# Patient Record
Sex: FEMALE | Race: WHITE
[De-identification: names, ages, dates, MRNs, and addresses within clinical notes are randomized per-mention and may not be internally consistent; named-entity substitution may affect disease eponyms.]

---

## 2020-04-11 ENCOUNTER — HOSPITAL ENCOUNTER (INPATIENT)
Dept: HOSPITAL 84 - D.ER | Age: 65
LOS: 2 days | Discharge: ANOTHER HOSPITAL | DRG: 917 | End: 2020-04-13
Attending: INTERNAL MEDICINE | Admitting: INTERNAL MEDICINE
Payer: COMMERCIAL

## 2020-04-11 VITALS — DIASTOLIC BLOOD PRESSURE: 84 MMHG | SYSTOLIC BLOOD PRESSURE: 144 MMHG

## 2020-04-11 VITALS — SYSTOLIC BLOOD PRESSURE: 118 MMHG | DIASTOLIC BLOOD PRESSURE: 71 MMHG

## 2020-04-11 VITALS — SYSTOLIC BLOOD PRESSURE: 164 MMHG | DIASTOLIC BLOOD PRESSURE: 87 MMHG

## 2020-04-11 VITALS — SYSTOLIC BLOOD PRESSURE: 134 MMHG | DIASTOLIC BLOOD PRESSURE: 81 MMHG

## 2020-04-11 VITALS — SYSTOLIC BLOOD PRESSURE: 167 MMHG | DIASTOLIC BLOOD PRESSURE: 91 MMHG

## 2020-04-11 VITALS — HEIGHT: 62 IN | WEIGHT: 94 LBS | BODY MASS INDEX: 17.3 KG/M2 | BODY MASS INDEX: 17.3 KG/M2

## 2020-04-11 VITALS — DIASTOLIC BLOOD PRESSURE: 98 MMHG | SYSTOLIC BLOOD PRESSURE: 181 MMHG

## 2020-04-11 VITALS — SYSTOLIC BLOOD PRESSURE: 176 MMHG | DIASTOLIC BLOOD PRESSURE: 91 MMHG

## 2020-04-11 VITALS — SYSTOLIC BLOOD PRESSURE: 165 MMHG | DIASTOLIC BLOOD PRESSURE: 99 MMHG

## 2020-04-11 VITALS — SYSTOLIC BLOOD PRESSURE: 140 MMHG | DIASTOLIC BLOOD PRESSURE: 78 MMHG

## 2020-04-11 VITALS — SYSTOLIC BLOOD PRESSURE: 123 MMHG | DIASTOLIC BLOOD PRESSURE: 73 MMHG

## 2020-04-11 VITALS — DIASTOLIC BLOOD PRESSURE: 84 MMHG | SYSTOLIC BLOOD PRESSURE: 155 MMHG

## 2020-04-11 DIAGNOSIS — F17.203: ICD-10-CM

## 2020-04-11 DIAGNOSIS — F33.2: ICD-10-CM

## 2020-04-11 DIAGNOSIS — N17.9: ICD-10-CM

## 2020-04-11 DIAGNOSIS — N39.0: ICD-10-CM

## 2020-04-11 DIAGNOSIS — I25.10: ICD-10-CM

## 2020-04-11 DIAGNOSIS — E11.65: ICD-10-CM

## 2020-04-11 DIAGNOSIS — T43.592A: Primary | ICD-10-CM

## 2020-04-11 DIAGNOSIS — G92: ICD-10-CM

## 2020-04-11 DIAGNOSIS — F41.1: ICD-10-CM

## 2020-04-11 DIAGNOSIS — I10: ICD-10-CM

## 2020-04-11 DIAGNOSIS — E83.42: ICD-10-CM

## 2020-04-11 DIAGNOSIS — F41.8: ICD-10-CM

## 2020-04-11 LAB
ALBUMIN SERPL-MCNC: 2.6 G/DL (ref 3.4–5)
ALP SERPL-CCNC: 116 U/L (ref 30–120)
ALT SERPL-CCNC: 15 U/L (ref 10–68)
AMPHETAMINES UR QL SCN: NEGATIVE QUAL
ANION GAP SERPL CALC-SCNC: 13.4 MMOL/L (ref 8–16)
APAP SERPL-MCNC: 17.2 UG/ML (ref 10–30)
BACTERIA #/AREA URNS HPF: (no result) /HPF
BARBITURATES UR QL SCN: NEGATIVE QUAL
BASOPHILS NFR BLD AUTO: 0.3 % (ref 0–2)
BENZODIAZ UR QL SCN: NEGATIVE QUAL
BILIRUB SERPL-MCNC: 0.19 MG/DL (ref 0.2–1.3)
BILIRUB SERPL-MCNC: NEGATIVE MG/DL
BUN SERPL-MCNC: 12 MG/DL (ref 7–18)
BZE UR QL SCN: NEGATIVE QUAL
CALCIUM SERPL-MCNC: 8.1 MG/DL (ref 8.5–10.1)
CANNABINOIDS UR QL SCN: NEGATIVE QUAL
CHLORIDE SERPL-SCNC: 105 MMOL/L (ref 98–107)
CO2 SERPL-SCNC: 24.6 MMOL/L (ref 21–32)
CREAT SERPL-MCNC: 0.7 MG/DL (ref 0.6–1.3)
EOSINOPHIL NFR BLD: 1.4 % (ref 0–7)
ERYTHROCYTE [DISTWIDTH] IN BLOOD BY AUTOMATED COUNT: 13.7 % (ref 11.5–14.5)
EST. AVERAGE GLUCOSE BLD GHB EST-MCNC: 306 MG/DL (ref 74–154)
ETHANOL SERPL-MCNC: 0 MG/DL (ref 0–10)
GLOBULIN SER-MCNC: 3 G/L
GLUCOSE SERPL-MCNC: 1000 MG/DL
GLUCOSE SERPL-MCNC: 415 MG/DL (ref 74–106)
HCT VFR BLD CALC: 41.8 % (ref 36–48)
HGB BLD-MCNC: 13.8 G/DL (ref 12–16)
IMM GRANULOCYTES NFR BLD: 0.6 % (ref 0–5)
KETONES UR STRIP-MCNC: NEGATIVE MG/DL
LYMPHOCYTES NFR BLD AUTO: 28.6 % (ref 15–50)
MAGNESIUM SERPL-MCNC: 1.5 MG/DL (ref 1.8–2.4)
MCH RBC QN AUTO: 31.1 PG (ref 26–34)
MCHC RBC AUTO-ENTMCNC: 33 G/DL (ref 31–37)
MCV RBC: 94.1 FL (ref 80–100)
MONOCYTES NFR BLD: 6.7 % (ref 2–11)
NEUTROPHILS NFR BLD AUTO: 62.4 % (ref 40–80)
NITRITE UR-MCNC: POSITIVE MG/ML
OPIATES UR QL SCN: NEGATIVE QUAL
OSMOLALITY SERPL CALC.SUM OF ELEC: 295 MOSM/KG (ref 275–300)
PCP UR QL SCN: NEGATIVE QUAL
PH UR STRIP: 5 [PH] (ref 5–6)
PLATELET # BLD: 269 10X3/UL (ref 130–400)
PMV BLD AUTO: 10.3 FL (ref 7.4–10.4)
POTASSIUM SERPL-SCNC: 4 MMOL/L (ref 3.5–5.1)
PROT SERPL-MCNC: 5.6 G/DL (ref 6.4–8.2)
RBC # BLD AUTO: 4.44 10X6/UL (ref 4–5.4)
RBC #/AREA URNS HPF: (no result) /HPF (ref 0–5)
SODIUM SERPL-SCNC: 139 MMOL/L (ref 136–145)
SP GR UR STRIP: 1.01 (ref 1–1.02)
SQUAMOUS #/AREA URNS HPF: (no result) /HPF (ref 0–5)
TSH SERPL-ACNC: 0.06 UIU/ML (ref 0.36–3.74)
UROBILINOGEN UR-MCNC: NORMAL MG/DL
WBC # BLD AUTO: 7.1 10X3/UL (ref 4.8–10.8)

## 2020-04-12 VITALS — SYSTOLIC BLOOD PRESSURE: 141 MMHG | DIASTOLIC BLOOD PRESSURE: 73 MMHG

## 2020-04-12 VITALS — DIASTOLIC BLOOD PRESSURE: 77 MMHG | SYSTOLIC BLOOD PRESSURE: 151 MMHG

## 2020-04-12 VITALS — SYSTOLIC BLOOD PRESSURE: 137 MMHG | DIASTOLIC BLOOD PRESSURE: 71 MMHG

## 2020-04-12 VITALS — DIASTOLIC BLOOD PRESSURE: 93 MMHG | SYSTOLIC BLOOD PRESSURE: 174 MMHG

## 2020-04-12 VITALS — SYSTOLIC BLOOD PRESSURE: 136 MMHG | DIASTOLIC BLOOD PRESSURE: 74 MMHG

## 2020-04-12 VITALS — DIASTOLIC BLOOD PRESSURE: 98 MMHG | SYSTOLIC BLOOD PRESSURE: 151 MMHG

## 2020-04-12 VITALS — DIASTOLIC BLOOD PRESSURE: 76 MMHG | SYSTOLIC BLOOD PRESSURE: 140 MMHG

## 2020-04-12 VITALS — DIASTOLIC BLOOD PRESSURE: 96 MMHG | SYSTOLIC BLOOD PRESSURE: 167 MMHG

## 2020-04-12 VITALS — SYSTOLIC BLOOD PRESSURE: 140 MMHG | DIASTOLIC BLOOD PRESSURE: 76 MMHG

## 2020-04-12 VITALS — SYSTOLIC BLOOD PRESSURE: 148 MMHG | DIASTOLIC BLOOD PRESSURE: 75 MMHG

## 2020-04-12 VITALS — DIASTOLIC BLOOD PRESSURE: 92 MMHG | SYSTOLIC BLOOD PRESSURE: 162 MMHG

## 2020-04-12 VITALS — SYSTOLIC BLOOD PRESSURE: 172 MMHG | DIASTOLIC BLOOD PRESSURE: 84 MMHG

## 2020-04-12 VITALS — DIASTOLIC BLOOD PRESSURE: 69 MMHG | SYSTOLIC BLOOD PRESSURE: 131 MMHG

## 2020-04-12 VITALS — DIASTOLIC BLOOD PRESSURE: 81 MMHG | SYSTOLIC BLOOD PRESSURE: 153 MMHG

## 2020-04-12 VITALS — SYSTOLIC BLOOD PRESSURE: 176 MMHG | DIASTOLIC BLOOD PRESSURE: 95 MMHG

## 2020-04-12 VITALS — DIASTOLIC BLOOD PRESSURE: 71 MMHG | SYSTOLIC BLOOD PRESSURE: 139 MMHG

## 2020-04-12 VITALS — SYSTOLIC BLOOD PRESSURE: 142 MMHG | DIASTOLIC BLOOD PRESSURE: 69 MMHG

## 2020-04-12 VITALS — SYSTOLIC BLOOD PRESSURE: 170 MMHG | DIASTOLIC BLOOD PRESSURE: 90 MMHG

## 2020-04-12 VITALS — DIASTOLIC BLOOD PRESSURE: 78 MMHG | SYSTOLIC BLOOD PRESSURE: 146 MMHG

## 2020-04-12 LAB
ANION GAP SERPL CALC-SCNC: 11.7 MMOL/L (ref 8–16)
BASOPHILS NFR BLD AUTO: 0.4 % (ref 0–2)
BUN SERPL-MCNC: 6 MG/DL (ref 7–18)
CALCIUM SERPL-MCNC: 8.4 MG/DL (ref 8.5–10.1)
CHLORIDE SERPL-SCNC: 108 MMOL/L (ref 98–107)
CO2 SERPL-SCNC: 25.4 MMOL/L (ref 21–32)
CREAT SERPL-MCNC: 0.6 MG/DL (ref 0.6–1.3)
EOSINOPHIL NFR BLD: 1.9 % (ref 0–7)
ERYTHROCYTE [DISTWIDTH] IN BLOOD BY AUTOMATED COUNT: 13.9 % (ref 11.5–14.5)
GLUCOSE SERPL-MCNC: 257 MG/DL (ref 74–106)
HCT VFR BLD CALC: 39.1 % (ref 36–48)
HGB BLD-MCNC: 12.7 G/DL (ref 12–16)
IMM GRANULOCYTES NFR BLD: 0.1 % (ref 0–5)
LYMPHOCYTES NFR BLD AUTO: 29.4 % (ref 15–50)
MCH RBC QN AUTO: 30.5 PG (ref 26–34)
MCHC RBC AUTO-ENTMCNC: 32.5 G/DL (ref 31–37)
MCV RBC: 93.8 FL (ref 80–100)
MONOCYTES NFR BLD: 7 % (ref 2–11)
NEUTROPHILS NFR BLD AUTO: 61.2 % (ref 40–80)
OSMOLALITY SERPL CALC.SUM OF ELEC: 287 MOSM/KG (ref 275–300)
PLATELET # BLD: 261 10X3/UL (ref 130–400)
PMV BLD AUTO: 9.4 FL (ref 7.4–10.4)
POTASSIUM SERPL-SCNC: 4.1 MMOL/L (ref 3.5–5.1)
RBC # BLD AUTO: 4.17 10X6/UL (ref 4–5.4)
SODIUM SERPL-SCNC: 141 MMOL/L (ref 136–145)
WBC # BLD AUTO: 7.9 10X3/UL (ref 4.8–10.8)

## 2020-04-13 ENCOUNTER — HOSPITAL ENCOUNTER (INPATIENT)
Dept: HOSPITAL 84 - D.PSYCH | Age: 65
LOS: 8 days | Discharge: HOME | DRG: 885 | End: 2020-04-21
Attending: PSYCHIATRY & NEUROLOGY | Admitting: PSYCHIATRY & NEUROLOGY
Payer: COMMERCIAL

## 2020-04-13 VITALS — SYSTOLIC BLOOD PRESSURE: 128 MMHG | DIASTOLIC BLOOD PRESSURE: 67 MMHG

## 2020-04-13 VITALS
BODY MASS INDEX: 17.57 KG/M2 | WEIGHT: 95.45 LBS | WEIGHT: 95.45 LBS | BODY MASS INDEX: 17.57 KG/M2 | HEIGHT: 62 IN | BODY MASS INDEX: 17.57 KG/M2 | HEIGHT: 62 IN

## 2020-04-13 VITALS — DIASTOLIC BLOOD PRESSURE: 67 MMHG | SYSTOLIC BLOOD PRESSURE: 128 MMHG

## 2020-04-13 VITALS — DIASTOLIC BLOOD PRESSURE: 73 MMHG | SYSTOLIC BLOOD PRESSURE: 109 MMHG

## 2020-04-13 VITALS — DIASTOLIC BLOOD PRESSURE: 67 MMHG | SYSTOLIC BLOOD PRESSURE: 127 MMHG

## 2020-04-13 VITALS — SYSTOLIC BLOOD PRESSURE: 167 MMHG | DIASTOLIC BLOOD PRESSURE: 68 MMHG

## 2020-04-13 VITALS — DIASTOLIC BLOOD PRESSURE: 88 MMHG | SYSTOLIC BLOOD PRESSURE: 129 MMHG

## 2020-04-13 VITALS — SYSTOLIC BLOOD PRESSURE: 160 MMHG | DIASTOLIC BLOOD PRESSURE: 74 MMHG

## 2020-04-13 VITALS — DIASTOLIC BLOOD PRESSURE: 89 MMHG | SYSTOLIC BLOOD PRESSURE: 122 MMHG

## 2020-04-13 VITALS — SYSTOLIC BLOOD PRESSURE: 131 MMHG | DIASTOLIC BLOOD PRESSURE: 92 MMHG

## 2020-04-13 VITALS — DIASTOLIC BLOOD PRESSURE: 101 MMHG | SYSTOLIC BLOOD PRESSURE: 140 MMHG

## 2020-04-13 VITALS — SYSTOLIC BLOOD PRESSURE: 141 MMHG | DIASTOLIC BLOOD PRESSURE: 91 MMHG

## 2020-04-13 VITALS — DIASTOLIC BLOOD PRESSURE: 78 MMHG | SYSTOLIC BLOOD PRESSURE: 163 MMHG

## 2020-04-13 VITALS — DIASTOLIC BLOOD PRESSURE: 77 MMHG | SYSTOLIC BLOOD PRESSURE: 176 MMHG

## 2020-04-13 DIAGNOSIS — E11.65: ICD-10-CM

## 2020-04-13 DIAGNOSIS — F33.2: Primary | ICD-10-CM

## 2020-04-13 DIAGNOSIS — T43.8X2D: ICD-10-CM

## 2020-04-13 DIAGNOSIS — N39.0: ICD-10-CM

## 2020-04-13 DIAGNOSIS — Z89.512: ICD-10-CM

## 2020-04-13 DIAGNOSIS — I10: ICD-10-CM

## 2020-04-13 DIAGNOSIS — I25.118: ICD-10-CM

## 2020-04-13 DIAGNOSIS — B96.20: ICD-10-CM

## 2020-04-13 LAB
ANION GAP SERPL CALC-SCNC: 12.7 MMOL/L (ref 8–16)
BASOPHILS NFR BLD AUTO: 0.4 % (ref 0–2)
BUN SERPL-MCNC: 11 MG/DL (ref 7–18)
CALCIUM SERPL-MCNC: 8.1 MG/DL (ref 8.5–10.1)
CHLORIDE SERPL-SCNC: 111 MMOL/L (ref 98–107)
CO2 SERPL-SCNC: 22.3 MMOL/L (ref 21–32)
CREAT SERPL-MCNC: 0.7 MG/DL (ref 0.6–1.3)
EOSINOPHIL NFR BLD: 1.2 % (ref 0–7)
ERYTHROCYTE [DISTWIDTH] IN BLOOD BY AUTOMATED COUNT: 14.1 % (ref 11.5–14.5)
GLUCOSE SERPL-MCNC: 177 MG/DL (ref 74–106)
HCT VFR BLD CALC: 36.8 % (ref 36–48)
HGB BLD-MCNC: 11.6 G/DL (ref 12–16)
IMM GRANULOCYTES NFR BLD: 0.4 % (ref 0–5)
LYMPHOCYTES NFR BLD AUTO: 38 % (ref 15–50)
MCH RBC QN AUTO: 30.1 PG (ref 26–34)
MCHC RBC AUTO-ENTMCNC: 31.5 G/DL (ref 31–37)
MCV RBC: 95.6 FL (ref 80–100)
MONOCYTES NFR BLD: 6.6 % (ref 2–11)
NEUTROPHILS NFR BLD AUTO: 53.4 % (ref 40–80)
OSMOLALITY SERPL CALC.SUM OF ELEC: 285 MOSM/KG (ref 275–300)
PLATELET # BLD: 256 10X3/UL (ref 130–400)
PMV BLD AUTO: 9.8 FL (ref 7.4–10.4)
POTASSIUM SERPL-SCNC: 4 MMOL/L (ref 3.5–5.1)
RBC # BLD AUTO: 3.85 10X6/UL (ref 4–5.4)
SODIUM SERPL-SCNC: 142 MMOL/L (ref 136–145)
WBC # BLD AUTO: 8.1 10X3/UL (ref 4.8–10.8)

## 2020-04-13 NOTE — NUR
NEW ADMIT TO DOCTOR PITTS ON SENIOR CARE FROM [facility] FOR ALTERED MENTAL
STATUS. PATIENT OVERDOSED ON SEROQUEL AND WAS INTUBATED IN ICU. AFTER
TRANSFERRING TO [facility], PATIENT WAS AGGRESSIVE WITH STAFF. PATIENT
TRANSFERRED TO SENIOR CARE VIA WHEELCHAIR. UPON ARRIVAL TO [facility],
PATIENT WAS RUDE AND ARGUMENTATIVE WITH STAFF. PATIENT DENIES SI. CONSENTS
TO TREAT RECEIVED FROM PATIENT. PATIENT IS A DNR. CODE WORD GIVEN TO SP0USE.
PATIENT ORIENTED TO UNIT, ROOM, AND CALL BELL SYSTEM.

## 2020-04-13 NOTE — [facility]ARE
CASE MANAGEMENT DISCHARGE SUMMARY
 
 
PATIENT: ALEXEY BAGLEY                        UNIT: [id number]
ACCOUNT#: [id number]                       ADM DATE: 20
AGE: 64     : 53  SEX: F            ROOM/BED: D.2208    
AUTHOR: JASMYNE EPPERSON                             PHYSICIAN:                               
 
REFERRING PHYSICIAN: MARCIA GARDNER MD               
DATE OF SERVICE: 20
Discharge Plan
 
 
Patient Name: ALEXEY BAGLEY
[facility]
Encounter #: [id number]
Medical Record #: [id number]
: 1955
Planned Disposition: Psych facility
Anticipated Discharge Date: 
 
Discharge Date: 2020
Expected LOS: 
Initial Reviewer: [id number]
Initial Review Date: 2020
Generated: 20   6:58 pm 
Comments
 
DCP- Discharge Planning
 
Updated by [id number]: Abigail Venegas on 20   4:45 pm CT
LATE ENTRY:  
RECEIVED A CALL FROM LILIAN ([phone]) AT THE  
OFFICE STATING THAT HER FAMILY HAS FILED FOR AN INVOLUNTARY COMMITMENT AND 
SHE NEEDED DOCUMENTATION, I ASKED PATIENT IF I COULD FAX THE DOCUMENTATION 
THAT THEY REQUESTED AND SHE DID NOT GIVE PERMISSION UNTIL SHE SPOKE WITH THE 
MANAGER OF SENIOR CARE. KADE WITH SENIOR CARE CAME AND SPOKE WITH PATIENT AND 
SHE WAS AGREEABLE TO GO, BUT WHEN DISCHARGE WAS GIVEN TO GO DOWN THERE THE 
PATIENT WAS NOT AGREEABLE. DR ISLAS PUT A HOLD ON HER AND THE PATIENT WAS 
ADMITTED TO SENIOR CARE.
  
 
 
 
 
 
 
 
Patient Name: ALEXEY BAGLEY
 
Encounter #: [id number]
Page [id number]
 
 
 
 
 
Electronically Signed by JASMYNE EPPERSON on 20 at 1759
 
 
 
 
 
 
**All edits/amendments must be made on the electronic document**
 
DICTATION DATE: 20     : SENDY  20     
RPT#: [id number]                                DC DATE:20
                                               STATUS: DIS IN  
[facility]
[address] [place] [address]
***END OF REPORT***

## 2020-04-13 NOTE — NUR
RECEIVED IN DINING AREA. MOVING ABOUT IN WHEELCHAIR. COOPERATIVE WITH CARE AND
ASSESSMENT AT THIS TIME. NO SIGNS OF AGGRESSION. REDIRECT AND REORIENT AS
NEEDED. ENCOURAGE TO EXPRESS NEEDS. SITTING IN WHEELCHAIR AT THIS TIME.
CONTINUE PLAN OF CARE.

## 2020-04-14 VITALS — SYSTOLIC BLOOD PRESSURE: 133 MMHG | DIASTOLIC BLOOD PRESSURE: 75 MMHG

## 2020-04-14 VITALS — SYSTOLIC BLOOD PRESSURE: 100 MMHG | DIASTOLIC BLOOD PRESSURE: 55 MMHG

## 2020-04-14 LAB
ALBUMIN SERPL-MCNC: 2.6 G/DL (ref 3.4–5)
ALP SERPL-CCNC: 110 U/L (ref 30–120)
ALT SERPL-CCNC: 15 U/L (ref 10–68)
ANION GAP SERPL CALC-SCNC: 15.3 MMOL/L (ref 8–16)
BASOPHILS NFR BLD AUTO: 0.2 % (ref 0–2)
BILIRUB SERPL-MCNC: 0.16 MG/DL (ref 0.2–1.3)
BUN SERPL-MCNC: 6 MG/DL (ref 7–18)
CALCIUM SERPL-MCNC: 8.4 MG/DL (ref 8.5–10.1)
CHLORIDE SERPL-SCNC: 107 MMOL/L (ref 98–107)
CHOLEST/HDLC SERPL: 4.2 RATIO (ref 2.3–4.1)
CO2 SERPL-SCNC: 21 MMOL/L (ref 21–32)
CREAT SERPL-MCNC: 0.7 MG/DL (ref 0.6–1.3)
EOSINOPHIL NFR BLD: 1.2 % (ref 0–7)
ERYTHROCYTE [DISTWIDTH] IN BLOOD BY AUTOMATED COUNT: 14 % (ref 11.5–14.5)
EST. AVERAGE GLUCOSE BLD GHB EST-MCNC: 306 MG/DL (ref 74–154)
GLOBULIN SER-MCNC: 2.9 G/L
GLUCOSE SERPL-MCNC: 129 MG/DL (ref 74–106)
HCT VFR BLD CALC: 36.3 % (ref 36–48)
HDLC SERPL-MCNC: 39 MG/DL (ref 32–96)
HGB BLD-MCNC: 11.6 G/DL (ref 12–16)
IMM GRANULOCYTES NFR BLD: 0.4 % (ref 0–5)
LDL-HDL RATIO: 2.2 RATIO (ref 1.5–3.5)
LDLC SERPL-MCNC: 86 MG/DL (ref 0–100)
LYMPHOCYTES NFR BLD AUTO: 35.2 % (ref 15–50)
MCH RBC QN AUTO: 30.4 PG (ref 26–34)
MCHC RBC AUTO-ENTMCNC: 32 G/DL (ref 31–37)
MCV RBC: 95 FL (ref 80–100)
MONOCYTES NFR BLD: 7.9 % (ref 2–11)
NEUTROPHILS NFR BLD AUTO: 55.1 % (ref 40–80)
OSMOLALITY SERPL CALC.SUM OF ELEC: 277 MOSM/KG (ref 275–300)
PLATELET # BLD: 251 10X3/UL (ref 130–400)
PMV BLD AUTO: 9.9 FL (ref 7.4–10.4)
POTASSIUM SERPL-SCNC: 4.3 MMOL/L (ref 3.5–5.1)
PROT SERPL-MCNC: 5.5 G/DL (ref 6.4–8.2)
RBC # BLD AUTO: 3.82 10X6/UL (ref 4–5.4)
SODIUM SERPL-SCNC: 139 MMOL/L (ref 136–145)
TRIGL SERPL-MCNC: 193 MG/DL (ref 30–200)
TSH SERPL-ACNC: 0.34 UIU/ML (ref 0.36–3.74)
WBC # BLD AUTO: 9.4 10X3/UL (ref 4.8–10.8)

## 2020-04-14 NOTE — NUR
RECEIVED IN DAYROOM. SITTING IN A WHEELCHAIR WITH PEERS AT HER SIDE. CALM AND
COOPERATIVE WITH CARE AND ASSESSMENT. NO DEMANDING BEHAVIOR AT THIS TIME.
REDIRECT AND REORIENT AS NEEDED. SITTING CALMLY AT THIS TIME.
CONTINUE PLAN OF CARE.

## 2020-04-14 NOTE — M[facility]ARE
CASE MANAGEMENT DISCHARGE SUMMARY
 
 
PATIENT: ALEXEY BAGLEY                        UNIT: [id number]
ACCOUNT#: [id number]                       ADM DATE: 20
AGE: 64     : 53  SEX: F            ROOM/BED: D.2208    
AUTHOR: JASMYNE EPPERSON                             PHYSICIAN:                               
 
REFERRING PHYSICIAN: MARCIA GARDNER MD               
DATE OF SERVICE: 20
Discharge Plan
 
 
Patient Name: ALEXEY BAGLEY
[facility]
Encounter #: [id number]
Medical Record #: [id number]
: 1955
Planned Disposition: Psych facility
Anticipated Discharge Date: 
 
Discharge Date: 2020
Expected LOS: 
Initial Reviewer: [id number]
Initial Review Date: 2020
Generated: 20  10:00 am 
Comments
 
DCP- Discharge Planning
 
Updated by [id number]: Abigail Venegas on 20   4:45 pm CT
LATE ENTRY:  
RECEIVED A CALL FROM LILIAN ([phone]) AT THE  
OFFICE STATING THAT HER FAMILY HAS FILED FOR AN INVOLUNTARY COMMITMENT AND 
SHE NEEDED DOCUMENTATION, I ASKED PATIENT IF I COULD FAX THE DOCUMENTATION 
THAT THEY REQUESTED AND SHE DID NOT GIVE PERMISSION UNTIL SHE SPOKE WITH THE 
MANAGER OF SENIOR CARE. KADE WITH SENIOR CARE CAME AND SPOKE WITH PATIENT AND 
SHE WAS AGREEABLE TO GO, BUT WHEN DISCHARGE WAS GIVEN TO GO DOWN THERE THE 
PATIENT WAS NOT AGREEABLE. DR ISLAS PUT A HOLD ON HER AND THE PATIENT WAS 
ADMITTED TO SENIOR CARE.
  
 
 
 
 
 
 
 
 
Last DP export: 20   4:59 p
 
Patient Name: ALEXEY BAGLEY
Encounter #: [id number]
Page [id number]
 
 
 
 
 
Electronically Signed by JASMYNE EPPERSON on 20 at 0900
 
 
 
 
 
 
**All edits/amendments must be made on the electronic document**
 
DICTATION DATE: 20 09     : SENDY  20 09     
RPT#: [id number]                                DC DATE:20
                                               STATUS: DIS IN  
[facility]
 [place] [place] [address]
***END OF REPORT***

## 2020-04-14 NOTE — PSY
PATIENT NAME:ALEXEY BAGLEY                                  MEDICAL RECORD: [id number]
: 53                                              LOCATION:[facility]
ADMISSION DATE: 20    ACCOUNT: [id number]
                                                           
PSYCHIATRIC EVALUATION
 
 
DATE OF EVALUATION: 20
 
 
IDENTIFYING DATA:  The patient is 64 years old and she is admitted to the
hospital on a voluntary basis.
 
CHIEF COMPLAINT:  Depression.
 
HISTORY OF PRESENT ILLNESS:  The patient apparently took Seroquel and presented
herself to the hospital.  She says that she is doing this to try and help
herself sleep, but she made suicidal statements in the Emergency Room, which she
later backed away from.  She now says she is not suicidal.  She also says she is
not depressed, but when asked about individual depressive symptoms, she clearly
has numerous symptoms.  She denies psychotic symptoms as well as thoughts of
harming others.
 
PAST MEDICAL HISTORY:  Significant for hypertension, coronary artery disease and
diabetes.  The patient fell a few days ago on her left hip, which has been
x-rayed and has no evidence of a fracture.
 
PAST PSYCHIATRIC HISTORY:  Denied by the patient, but on closer evaluation, it
is clear that she had significant problems on an outpatient basis.  She says she
has never attempted to harm herself.
 
FAMILY HISTORY:  Significant for diabetes and heart disease.
 
ALLERGIES:  CODEINE.
 
CURRENT MEDICATIONS:  Include Levaquin, hydrocodone, Tylenol, Klonopin,
Protonix, insulin, Seroquel, Ativan.
 
SOCIAL HISTORY:  The patient is  and does have adult children.  She
denies a history of drug or alcohol abuse.  She denies legal entanglements.
 
MENTAL STATUS EXAMINATION:  The patient is awake, alert and oriented to person,
place and situation.  She is mildly mistaken about the date.  Her mood is angry.
 Her affect is constricted.  Thought processes are circumstantial.  Memory,
concentration, and abstraction abilities are moderately impaired and she denies
any intent to harm herself or others as well as active psychotic symptoms.
 
ASSESSMENT:
AXIS I:
1.  Major depression, severe, recurrent without psychotic features.
2.  Probable polysubstance abuse.
AXIS II:  Cluster B personality traits.
AXIS III:  Hypertension, diabetes, history of myocardial infarction, history of
stroke, left below-knee amputation, antipsychotic overdose, intentional.
AXIS IV:  Moderate stressors.
AXIS V:  Global assessment of functioning is 40.
 
PLAN:  At this time, the patient is admitted to the hospital secondary to a
 
significant overdose of an antipsychotic medication that was made for the
purposes of trying to harm herself.  The amount of medicine she took is not
consistent with trying to get a good night's sleep and she told others that she
was wanting to kill herself, which she now is backing away from.  At this point,
I am going to treat her with antidepressant medications and we will observe her
mood and behaviors.
 
TRANSINT:[id number] Voice Confirmation ID: [id number] DOCUMENT ID: [id number]
                                           
                                           SANDEEP PITTS MD             
 
 
 
Electronically Signed by SANDEEP PITTS on 20 at 1726
 
 
 
 
 
 
 
 
 
 
 
 
 
 
 
 
 
 
 
 
 
 
 
 
 
 
 
 
 
 
 
 
 
 
 
 
 
 
CC:                                                             [id number]
DICTATION DATE: 20 1628     :     20 1651      ADM IN  
                                                                              
[address] [place] [address]

## 2020-04-15 VITALS — DIASTOLIC BLOOD PRESSURE: 74 MMHG | SYSTOLIC BLOOD PRESSURE: 139 MMHG

## 2020-04-15 VITALS — SYSTOLIC BLOOD PRESSURE: 139 MMHG | DIASTOLIC BLOOD PRESSURE: 74 MMHG

## 2020-04-15 VITALS — DIASTOLIC BLOOD PRESSURE: 84 MMHG | SYSTOLIC BLOOD PRESSURE: 136 MMHG

## 2020-04-15 NOTE — NUR
B.) PT IS ALERT AND ORIENTED TO SELF AND SITUATION. SHE IS ABLE MAKE HER NEEDS
KNOWN. SHE IS RECEIVED IN THE DAYROOM ON THE COUCH. SHE IS ABLE TO TRANSFER
WITH ASSIST. SHE IS SOCIALIZING WITH PEERS. SHE IS WEARING HER ISOLATION GOWN
AND GLOVES.
I.) PROVIDED PM MEDICATIONS AS PRESCRIBED. REDIRECT AS NEEDED.
R.) COMPLIANT WITH ALL MEDICATIONS. EASY TO REDIRECT.
P.) WILL CONTINUE TO MONITOR.

## 2020-04-15 NOTE — NUR
RECEIVED IN HALLWAY OUTSIDE OF NURSES STATION. CALM AND COOPERATIVE WITH CARE
AND ASSESSMENT. DENIES SUICIDAL IDEATION. REDIRECT AND REORIENT AS NEEDED.
EATING LUNCH AT THIS TIME. CONTINUE PLAN OF CARE.

## 2020-04-16 VITALS — DIASTOLIC BLOOD PRESSURE: 73 MMHG | SYSTOLIC BLOOD PRESSURE: 120 MMHG

## 2020-04-16 NOTE — PN
PATIENT:ALEXEY BAGLEY                              MEDICAL RECORD: [id number]
                                                         LOCATION:[facility]
                                                         ADMISSION DATE: 04/13/20
 
PROGRESS NOTE
 
 
DATE OF SERVICE:  04/15/2020
 
SUBJECTIVE:  The patient's case was discussed with staff.  She has no new
complaint.
 
OBJECTIVE:  The patient is in good behavioral control.  She has an improvement
in her mood.  She is much less irritable.  She denies that she would seek to
harm herself.  She does endorse numerous depressive symptoms.
 
ASSESSMENT:  Major depression.
 
PLAN:  Current medicines have been reviewed.  I am going to increase the dose of
her antidepressant medication.  I am going to start her on a scheduled dose of
Ultram and we will discontinue her p.r.n. hydrocodone.  She also is requesting
Flexeril for the musculoskeletal discomfort that she is having.
 
TRANSINT:[id number] Voice Confirmation ID: [id number] DOCUMENT ID: [id number]
 
 
 
 
                                           
                                           SANDEEP PITTS MD             
 
 
 
Electronically Signed by SANDEEP PITTS on 04/16/20 at 1537
 
 
 
 
 
 
 
 
 
 
 
 
 
 
 
 
 
CC:                                                             [id number]
DICTATION DATE: 04/15/20 1301     :     04/15/20 1308      ADM IN  
                                                                              
[address] [place] [address]

## 2020-04-16 NOTE — NUR
PT SITTING IN CHAIR WITH LEG PROPPED UP AT THIS TIME. PT IS ALERT AND ORIENTED
AT TIMES. REDIRECT AND REORIENT AS NEEDED. PT DENIES ANY PAIN. PT CONTS ON
CONTACT ISOLATION AND CONTS ON GENTAMICIN 60 MG IM PT HAS HAD 2 DOSES AT THIS
TIME. PT HAS NO S/SX. PT HAS HAD NO BEHAVIORS NOTED AT THIS TIME. PT IS
COMPLIANT WITH STAFF WITH ASSESSMENT AND MEDS. CHAIR ALARM IN PLACE AND
ACTIVE. WILL CONT PLAN OF CARE.

## 2020-04-16 NOTE — NUR
Nutrition Follow-up:
Eating well.
Diet: Diabetic
PO intake: 98% avg x last 6 meals
Wt: 98# (4/14); 98.8# (4/13)
No BMs recorded
Labs noted: Glu 133
Meds noted: vitamin D, Lantus, Humalog
-Encourage PO intake and honor food preferences within diet restrictions.
-Offer Glucerna with meals.
-Monitor wt.
-RD following.

## 2020-04-17 VITALS — DIASTOLIC BLOOD PRESSURE: 79 MMHG | SYSTOLIC BLOOD PRESSURE: 133 MMHG

## 2020-04-17 VITALS — SYSTOLIC BLOOD PRESSURE: 106 MMHG | DIASTOLIC BLOOD PRESSURE: 56 MMHG

## 2020-04-17 NOTE — NUR
PATIENT IS QUIET, DROWSY, ROOM WAS SEARCHED FOR CONTRABAND, NONE FOUND,
PATIENT SAYS THAT SHE IS TIRED AND THAT IS WHY SHE SLEEPS SO MUCH. COMPLIANT
WITH MEDS, CAN DO ADL'S INDEPENDENTLY. WILL FOLLOW POC

## 2020-04-17 NOTE — NUR
The patient is awake and pleasant, she is oriented to self and she has poor
insight into her situation. She is in a w/c and can self propel. She is on
isolation and she is compliant with gloves and a gown. She is confused.
Provide prescribed meds. The patient is compliant with meds and she is
compliant with care. Continue POC.

## 2020-04-17 NOTE — NUR
FSBS 35, PROVIDED ORANGE JUICE WITH 2 PACKETS OF SUGAR, ONE TUB OF PEANUT
BUTTER AND A VANILLA PUDDING, CALLED STEFANY SIDDIQI TO LET HER KNOW.

## 2020-04-17 NOTE — NUR
B)RECEIVED PT SITTING IN CHAIR IN THE DAYROOM WITH HEAD DOWN AND EYES CLOSED.
AROUSED TO VERBA  STIMULI. CONFUSED AND DISORIENTED. NO INSIGHT RELATING THE
REASON FOR HOSPITALIZATION IS "I KEPT FALLING ASLEEP AT THE WHEEL." PATIENT IS
ON CONTACT ISOLATION FOR ESBL IN URINE. WEARING YELLOW ISOLATION GOWN.
I)ADMINISTER MEDS AND MONITOR COMPLIANCE. REORIENT AS NEEDED. R)MED COMPLIANT.
POOR REORIENTATION DUE TO TO IMPAIRED ABILITY TO RETAIN INFORMATION.
P)CONTINUE POC AND PROVIDE SAFE ENVIRONMENT.

## 2020-04-17 NOTE — PN
PATIENT:ALEXEY BAGLEY                              MEDICAL RECORD: [id number]
                                                         LOCATION:[facility]
                                                         ADMISSION DATE: 04/13/20
 
PROGRESS NOTE
 
 
DATE OF SERVICE:  04/16/2020
 
SUBJECTIVE:  The patient's case was discussed with staff.  She has no new
complaint.
 
OBJECTIVE:  The patient denies that she would seek to harm herself or others. 
She is generally tolerating her medications well.
 
ASSESSMENT:  Major depression.
 
PLAN:  The patient's noontime dose of Seroquel is going to be discontinued.  She
will be monitored for clinical changes associated with its use.
 
TRANSINT:[id number] Voice Confirmation ID: [id number] DOCUMENT ID: [id number]
 
 
 
 
                                           
                                           SANDEEP PITTS MD             
 
 
 
Electronically Signed by SANDEEP PITTS on 04/17/20 at 1548
 
 
 
 
 
 
 
 
 
 
 
 
 
 
 
 
 
 
 
 
CC:                                                             [id number]
DICTATION DATE: 04/16/20 1701     :     04/16/20 1714      ADM IN  
                                                                              
[address] [place] [address]

## 2020-04-17 NOTE — NUR
FSBS 425, ORDERED a stat glucose and called Frieda SIDDIQI, she ordered a
one time dose of 20 units Humalog.

## 2020-04-18 VITALS — SYSTOLIC BLOOD PRESSURE: 113 MMHG | DIASTOLIC BLOOD PRESSURE: 67 MMHG

## 2020-04-18 VITALS — SYSTOLIC BLOOD PRESSURE: 127 MMHG | DIASTOLIC BLOOD PRESSURE: 74 MMHG

## 2020-04-18 NOTE — NUR
PATIENT IS CONFUSED, QUIET, ALWAYS DROWSY, STAYS TO HERSELF, POOR EYE CONTACT.
COMPLIANT WITH MEDS, NO ADVERSE REACTION NOTED. WILL FOLLOW POC

## 2020-04-18 NOTE — PN
PATIENT:ALEXEY BAGLEY                              MEDICAL RECORD: [id number]
                                                         [facility]
                                                         ADMISSION DATE: 04/13/20
 
PROGRESS NOTE
 
 
DATE OF SERVICE:  04/17/2020
 
SUBJECTIVE:  The patient's case was discussed with staff.  She has no new
complaint.
 
OBJECTIVE:  The patient is in good behavioral control, but not having any
thoughts of harming herself or others.  She is tolerating her medicines well.
 
ASSESSMENT:  Major depression.
 
PLAN:  Current medications will be maintained.  Long-term prognosis is guarded.
 
TRANSINT:[id number] Voice Confirmation ID: [id number] DOCUMENT ID: [id number]
 
 
 
 
                                           
                                           SANDEEP PITTS MD             
 
 
 
Electronically Signed by SANDEEP PITTS on 04/18/20 at 0841
 
 
 
 
 
 
 
 
 
 
 
 
 
 
 
 
 
 
 
 
 
CC:                                                             [id number]
DICTATION DATE: 04/17/20 1827     :     04/17/20 2039      ADM IN  
                                                                              
[address] [place] [address]

## 2020-04-18 NOTE — NUR
The patient is very drowsy, she is awake enough to eat, but she is sleeping
most of the time. She is able to self propel in a w/c and she can transfer by
herself. She has not shown any aggression this am. Provide prescribed meds.
The patient is compliant with meds. She is cooperative with care. Continue
POC.

## 2020-04-19 VITALS — DIASTOLIC BLOOD PRESSURE: 64 MMHG | SYSTOLIC BLOOD PRESSURE: 104 MMHG

## 2020-04-19 VITALS — DIASTOLIC BLOOD PRESSURE: 81 MMHG | SYSTOLIC BLOOD PRESSURE: 140 MMHG

## 2020-04-19 NOTE — NUR
RECEIVED IN HALLWAY OUTSIDE OF NURSES STATION. CALM AND COOPERATIVE WITH CARE
AND ASSESSMENT. DENIES SUICIDAL IDEATION. NO BEHAVIORS. REDIRECT AND REORIENT
AS NEEDED. EATING LUNCH AT THIS TIME. CONTINUE PLAN OF CARE.

## 2020-04-19 NOTE — NUR
RECEIVED IN DINING ROOM AREA. SITTING IN A WHEELCHAIR WITH EYES CLOSED. CALM
AND COOPERATIVE WITH CARE AND ASSESSMENT. NO SIGNS OF AGGRESSION. REDIRECT AND
REORIENT AS NEEDED. CONTINUES TO SIT QUIETLY IN DAYROOM. CONTINUE PLAN OF
CARE.

## 2020-04-19 NOTE — PN
PATIENT:ALEXEY BAGLEY                              MEDICAL RECORD: [id number]
                                                         LOCATION:[facility]
                                                         ADMISSION DATE: 04/13/20
 
PROGRESS NOTE
 
 
DATE OF SERVICE:  04/18/2020
 
SUBJECTIVE:  The patient's case was discussed with staff.  She has no new
complaint.
 
OBJECTIVE:  The patient is in good behavioral control with limited insight about
her condition.  She tolerates her medications well.
 
ASSESSMENT:  Major depression.
 
PLAN:  The patient will be treated with current medications.  I am, however,
going to taper the dose of the Ultram slightly.  She has no thoughts of harming
herself at this time.
 
TRANSINT:[id number] Voice Confirmation ID: [id number] DOCUMENT ID: [id number]
 
 
 
 
                                           
                                           SANDEEP PITTS MD             
 
 
 
Electronically Signed by SANDEEP PITTS on 04/19/20 at 1342
 
 
 
 
 
 
 
 
 
 
 
 
 
 
 
 
 
 
 
CC:                                                             [id number]
DICTATION DATE: 04/18/20 0856     :     04/18/20 0927      ADM IN  
                                                                              
[id number] [place] [id number]

## 2020-04-20 VITALS — DIASTOLIC BLOOD PRESSURE: 60 MMHG | SYSTOLIC BLOOD PRESSURE: 110 MMHG

## 2020-04-20 VITALS — SYSTOLIC BLOOD PRESSURE: 121 MMHG | DIASTOLIC BLOOD PRESSURE: 58 MMHG

## 2020-04-20 NOTE — NUR
B.) PT IS ALERT AND ORIENTED TO SELF, TIME, AND PLACE. SHE IS HAS POOR INSIGHT
INTO HER SITUATION AT TIMES. SHE USES A WHEELCHAIR TO ASSIT WITH AMBULATION.
SHE IS CALM AND COOPERATIVE WITH STAFF AND IS OBSERVED SOCIALIZING WITH PEERS.
I.) PROVIDED PM MEDICATIONS AS PRESCRIBED. REDIRECT AS NEEDED.
R.) COMPLIANT WITH ALL MEDICATIONS. EASY TO REDIRECT.
P.) WILL CONTINUE TO MONITOR.

## 2020-04-21 VITALS — DIASTOLIC BLOOD PRESSURE: 63 MMHG | SYSTOLIC BLOOD PRESSURE: 131 MMHG

## 2020-04-21 NOTE — PN
PATIENT:ALEXEY BAGLEY                              MEDICAL RECORD: [id number]
                                                         LOCATION:[facility]
                                                         ADMISSION DATE: 04/13/20
 
PROGRESS NOTE
 
 
DATE OF SERVICE:  04/19/2020
 
SUBJECTIVE:  The patient's case was discussed with staff.  She has no new
complaint.
 
OBJECTIVE:  The patient denies that she would seek to harm herself or others. 
She is somewhat sedated and will have her trazodone discontinued.
 
ASSESSMENT:  Major depression.
 
PLAN:  Current medicines have been reviewed and will be maintained.  Long-term
prognosis is guarded.
 
TRANSINT:[id number] Voice Confirmation ID: [id number] DOCUMENT ID: [id number]
 
 
 
 
                                           
                                           SANDEEP PITTS MD             
 
 
 
Electronically Signed by SANDEEP PITTS on 04/21/20 at 1339
 
 
 
 
 
 
 
 
 
 
 
 
 
 
 
 
 
 
 
 
CC:                                                             [id number]
DICTATION DATE: 04/20/20 1535     :     04/20/20 1538      ADM IN  
                                                                              
[facility]                                          
[address] [place] [address]

## 2020-04-21 NOTE — NUR
RECEIVED IN HALLWAY OUTSIDE OF NURSES STATION. CALM AND COOPERATIVE WITH CARE
AND ASSESSMENT. DENIES SUICIDAL IDEATION. NO BEHAVIORS. REDIRECT AND REORIENT
AS NEEDED. EATING BREAKFAST AT THIS TIME. CONTINUE PLAN OF CARE.

## 2020-04-21 NOTE — PN
PATIENT:ALEXEY BAGLEY                              MEDICAL RECORD: [id number]
                                                         LOCATION:[facility]
                                                         ADMISSION DATE: 04/13/20
 
PROGRESS NOTE
 
 
DATE OF SERVICE:  04/20/2020
 
SUBJECTIVE:  The patient's case was discussed with staff.  She has no new
complaint.
 
OBJECTIVE:  The patient is awake, alert and fully oriented.  She has no thoughts
of harming herself or others.  I have discussed her situation with the treatment
team and it appears that her  is wanting her placed in some sort of a
facility such as a nursing home, which is not possible since she does not have
dementia and does not have a scalable need.
 
ASSESSMENT:  Major depression.
 
PLAN:  The patient is to be followed on an outpatient basis by her primary care
physician as well as the [facility].  Her long-term
prognosis is guarded.  Both supportive and educational interventions were made. 
I anticipate discharging her tomorrow.
 
TRANSINT:[id number] Voice Confirmation ID: [id number] DOCUMENT ID: [id number]
 
 
 
 
                                           
                                           SANDEEP PITTS MD             
 
 
 
Electronically Signed by SANDEEP PITTS on 04/21/20 at 1339
 
 
 
 
 
 
 
 
 
 
 
 
 
 
 
CC:                                                             [id number]
DICTATION DATE: 04/20/20 1536     :     04/20/20 1543      ADM IN  
                                                                              
[facility]                                          
[address] [id number]

## 2020-04-21 NOTE — NUR
PATIENT DISCHARGED HOME WITH . PAPERWORK FAXED TO PCP. HARD COPY SENT
WITH PATIENT. DISCHARGE INFORMATION AND MEDICATIONS REVIEWED WITH PATIENT AND
. THEY VERBALIZED UNDERSTANDING. MEDICATIONS SENT ELECTRONICALLY TO
PHARMACY.

## 2020-04-22 NOTE — PN
PATIENT:ALEXEY BAGLEY                              MEDICAL RECORD: [id number]
                                                         LOCATION:[facility]
                                                         ADMISSION DATE: 04/13/20
 
PROGRESS NOTE
 
 
DATE OF SERVICE:  04/21/2020
 
SUBJECTIVE:  The patient's case was discussed with staff.  She has no new
complaint.
 
OBJECTIVE:  The patient will be transitioned out of the hospital today. 
Followup will be with her primary care physician.
 
TRANSINT:[id number] Voice Confirmation ID: [id number] DOCUMENT ID: [id number]
 
 
 
 
                                           
                                           SANDEEP PITTS MD             
 
 
 
Electronically Signed by SANDEEP PITTS on 04/22/20 at 1120
 
 
 
 
 
 
 
 
 
 
 
 
 
 
 
 
 
 
 
 
 
 
 
 
 
CC:                                                             [id number]
DICTATION DATE: 04/21/20 1420     :     04/21/20 1527      DIS IN  
                                                                      04/21/20
[address] [place] [address]

## 2020-06-24 ENCOUNTER — HOSPITAL ENCOUNTER (INPATIENT)
Dept: HOSPITAL 84 - D.ER | Age: 65
LOS: 2 days | Discharge: HOME | DRG: 638 | End: 2020-06-26
Attending: FAMILY MEDICINE | Admitting: FAMILY MEDICINE
Payer: COMMERCIAL

## 2020-06-24 VITALS — SYSTOLIC BLOOD PRESSURE: 128 MMHG | DIASTOLIC BLOOD PRESSURE: 79 MMHG

## 2020-06-24 VITALS — SYSTOLIC BLOOD PRESSURE: 116 MMHG | DIASTOLIC BLOOD PRESSURE: 77 MMHG

## 2020-06-24 VITALS — BODY MASS INDEX: 19.32 KG/M2 | WEIGHT: 105 LBS | HEIGHT: 62 IN

## 2020-06-24 VITALS — SYSTOLIC BLOOD PRESSURE: 136 MMHG | DIASTOLIC BLOOD PRESSURE: 84 MMHG

## 2020-06-24 DIAGNOSIS — E11.40: ICD-10-CM

## 2020-06-24 DIAGNOSIS — J44.9: ICD-10-CM

## 2020-06-24 DIAGNOSIS — F43.25: ICD-10-CM

## 2020-06-24 DIAGNOSIS — E11.65: Primary | ICD-10-CM

## 2020-06-24 DIAGNOSIS — Z86.73: ICD-10-CM

## 2020-06-24 DIAGNOSIS — E87.1: ICD-10-CM

## 2020-06-24 DIAGNOSIS — I25.10: ICD-10-CM

## 2020-06-24 DIAGNOSIS — E11.51: ICD-10-CM

## 2020-06-24 DIAGNOSIS — Z89.512: ICD-10-CM

## 2020-06-24 DIAGNOSIS — F17.203: ICD-10-CM

## 2020-06-24 LAB
ALBUMIN SERPL-MCNC: 3.4 G/DL (ref 3.4–5)
ALP SERPL-CCNC: 126 U/L (ref 30–120)
ALT SERPL-CCNC: 26 U/L (ref 10–68)
ANION GAP SERPL CALC-SCNC: 10.4 MMOL/L (ref 8–16)
BASOPHILS NFR BLD AUTO: 0.2 % (ref 0–2)
BILIRUB SERPL-MCNC: 0.14 MG/DL (ref 0.2–1.3)
BILIRUB SERPL-MCNC: NEGATIVE MG/DL
BUN SERPL-MCNC: 12 MG/DL (ref 7–18)
CALCIUM SERPL-MCNC: 9.3 MG/DL (ref 8.5–10.1)
CHLORIDE SERPL-SCNC: 100 MMOL/L (ref 98–107)
CO2 SERPL-SCNC: 27.7 MMOL/L (ref 21–32)
CREAT SERPL-MCNC: 1 MG/DL (ref 0.6–1.3)
CRP SERPL-MCNC: 0.3 MG/DL (ref 0–0.9)
EOSINOPHIL NFR BLD: 1.2 % (ref 0–7)
ERYTHROCYTE [DISTWIDTH] IN BLOOD BY AUTOMATED COUNT: 13.3 % (ref 11.5–14.5)
FERRITIN SERPL-MCNC: 71 NG/ML (ref 3–244)
GLOBULIN SER-MCNC: 3.5 G/L
GLUCOSE SERPL-MCNC: 1000 MG/DL
GLUCOSE SERPL-MCNC: 209 MG/DL (ref 74–106)
HCT VFR BLD CALC: 38 % (ref 36–48)
HGB BLD-MCNC: 12.6 G/DL (ref 12–16)
IMM GRANULOCYTES NFR BLD: 0.6 % (ref 0–5)
KETONES UR STRIP-MCNC: NEGATIVE MG/DL
LYMPHOCYTES NFR BLD AUTO: 41.7 % (ref 15–50)
MCH RBC QN AUTO: 31.2 PG (ref 26–34)
MCHC RBC AUTO-ENTMCNC: 33.2 G/DL (ref 31–37)
MCV RBC: 94.1 FL (ref 80–100)
MONOCYTES NFR BLD: 6.9 % (ref 2–11)
NEUTROPHILS NFR BLD AUTO: 49.4 % (ref 40–80)
NITRITE UR-MCNC: NEGATIVE MG/ML
OSMOLALITY SERPL CALC.SUM OF ELEC: 273 MOSM/KG (ref 275–300)
PH UR STRIP: 5 [PH] (ref 5–6)
PLATELET # BLD: 261 10X3/UL (ref 130–400)
PMV BLD AUTO: 9.8 FL (ref 7.4–10.4)
POTASSIUM SERPL-SCNC: 4.1 MMOL/L (ref 3.5–5.1)
PROT SERPL-MCNC: 6.9 G/DL (ref 6.4–8.2)
RBC # BLD AUTO: 4.04 10X6/UL (ref 4–5.4)
SODIUM SERPL-SCNC: 134 MMOL/L (ref 136–145)
SP GR UR STRIP: 1.01 (ref 1–1.02)
UROBILINOGEN UR-MCNC: NORMAL MG/DL
WBC # BLD AUTO: 8.7 10X3/UL (ref 4.8–10.8)

## 2020-06-25 VITALS — DIASTOLIC BLOOD PRESSURE: 71 MMHG | SYSTOLIC BLOOD PRESSURE: 126 MMHG

## 2020-06-25 VITALS — DIASTOLIC BLOOD PRESSURE: 53 MMHG | SYSTOLIC BLOOD PRESSURE: 81 MMHG

## 2020-06-25 VITALS — SYSTOLIC BLOOD PRESSURE: 125 MMHG | DIASTOLIC BLOOD PRESSURE: 63 MMHG

## 2020-06-25 VITALS — SYSTOLIC BLOOD PRESSURE: 182 MMHG | DIASTOLIC BLOOD PRESSURE: 62 MMHG

## 2020-06-25 VITALS — DIASTOLIC BLOOD PRESSURE: 62 MMHG | SYSTOLIC BLOOD PRESSURE: 115 MMHG

## 2020-06-25 VITALS — SYSTOLIC BLOOD PRESSURE: 166 MMHG | DIASTOLIC BLOOD PRESSURE: 58 MMHG

## 2020-06-25 VITALS — SYSTOLIC BLOOD PRESSURE: 180 MMHG | DIASTOLIC BLOOD PRESSURE: 89 MMHG

## 2020-06-25 VITALS — SYSTOLIC BLOOD PRESSURE: 151 MMHG | DIASTOLIC BLOOD PRESSURE: 78 MMHG

## 2020-06-25 LAB
ALBUMIN SERPL-MCNC: 3.2 G/DL (ref 3.4–5)
ALP SERPL-CCNC: 122 U/L (ref 30–120)
ALT SERPL-CCNC: 24 U/L (ref 10–68)
ANION GAP SERPL CALC-SCNC: 10.6 MMOL/L (ref 8–16)
APTT BLD: 30.1 SECONDS (ref 22.8–39.4)
BASOPHILS NFR BLD AUTO: 0.2 % (ref 0–2)
BILIRUB SERPL-MCNC: 0.23 MG/DL (ref 0.2–1.3)
BUN SERPL-MCNC: 11 MG/DL (ref 7–18)
CALCIUM SERPL-MCNC: 8.5 MG/DL (ref 8.5–10.1)
CHLORIDE SERPL-SCNC: 101 MMOL/L (ref 98–107)
CO2 SERPL-SCNC: 27.7 MMOL/L (ref 21–32)
CREAT SERPL-MCNC: 0.8 MG/DL (ref 0.6–1.3)
EOSINOPHIL NFR BLD: 1.1 % (ref 0–7)
ERYTHROCYTE [DISTWIDTH] IN BLOOD BY AUTOMATED COUNT: 13.4 % (ref 11.5–14.5)
GLOBULIN SER-MCNC: 3.3 G/L
GLUCOSE SERPL-MCNC: 318 MG/DL (ref 74–106)
HCT VFR BLD CALC: 38.4 % (ref 36–48)
HGB BLD-MCNC: 12.7 G/DL (ref 12–16)
IMM GRANULOCYTES NFR BLD: 0.5 % (ref 0–5)
INR PPP: 1 (ref 0.85–1.17)
LYMPHOCYTES NFR BLD AUTO: 24 % (ref 15–50)
MCH RBC QN AUTO: 31.1 PG (ref 26–34)
MCHC RBC AUTO-ENTMCNC: 33.1 G/DL (ref 31–37)
MCV RBC: 94.1 FL (ref 80–100)
MONOCYTES NFR BLD: 6.8 % (ref 2–11)
NEUTROPHILS NFR BLD AUTO: 67.4 % (ref 40–80)
OSMOLALITY SERPL CALC.SUM OF ELEC: 280 MOSM/KG (ref 275–300)
PLATELET # BLD: 291 10X3/UL (ref 130–400)
PMV BLD AUTO: 9.9 FL (ref 7.4–10.4)
POTASSIUM SERPL-SCNC: 4.3 MMOL/L (ref 3.5–5.1)
PROT SERPL-MCNC: 6.5 G/DL (ref 6.4–8.2)
PROTHROMBIN TIME: 13.2 SECONDS (ref 11.6–15)
RBC # BLD AUTO: 4.08 10X6/UL (ref 4–5.4)
SODIUM SERPL-SCNC: 135 MMOL/L (ref 136–145)
WBC # BLD AUTO: 11 10X3/UL (ref 4.8–10.8)

## 2020-06-25 NOTE — NUR
PT COMPLAINED OF ANXIETY AND BEING "ON EDGE." SPOKE WITH  WHO
VERBALLY INSTRUCTED TO ADMININSTER THE PRN BENADRYL. PT REFUSED THE BENADRYL
STATING "IM GOING TO NEED SOMETHING ALOT STRONGER THAN THAT." INFORMED
 WHO THEN VERBALLY ORDERED 0.5MG ATIVAN PO Q8HP FOR ANXIETY.
ORDER PLACED. WILL CTM.

## 2020-06-25 NOTE — NUR
PT ARRIVED TO FLOOR VIA STRETCHER. NO SIGNS OF DISTRESS NOTED. RESPIRATIONS
EVEN AND UNLABORED. PT IS RESTING IN BED QUIETLY EASILY AWAKEN WITH VOICE
STIMULATION. BED IS IN ITS LOWEST POSITION. BED ALARM ON AND ACTIVE. CALL
LIGHT AND OTHER PERSONAL ITEMS WITH IN REACH. PT ENCOURAGED TO CALL FOR HELP
WHEN NEEDED AND WHEN GETTING IN AND OUT OF BED. WILL CONTINUE TO MONITOR

## 2020-06-25 NOTE — NUR
AM SNACK GIVEN TO PT PER PT REQUEST. PT RESTING IN BED QUIETLY. EASILY AWAKEN
WITH VOICE STIMULATION. ASSIST PT TO BSC AND BACK TO BED. FAMILY CALLED
TO CHECK ON HER. BED IS IN LOWEST POSITON AND CALL LIGHT AND OTHER PERSONAL
ITEMS WITH IN REACH. BED ALARM ON AND ACTIVE. FALL SOCKS ARE ON AND YELLOW
FALL RISK BAND ON. PT ENCOURAGED TO CALL FOR HELP WHEN NEEDED. WILL CONTINUE
TO MONITOR

## 2020-06-26 VITALS — SYSTOLIC BLOOD PRESSURE: 121 MMHG | DIASTOLIC BLOOD PRESSURE: 68 MMHG

## 2020-06-26 VITALS — DIASTOLIC BLOOD PRESSURE: 71 MMHG | SYSTOLIC BLOOD PRESSURE: 137 MMHG

## 2020-06-26 VITALS — SYSTOLIC BLOOD PRESSURE: 135 MMHG | DIASTOLIC BLOOD PRESSURE: 74 MMHG

## 2020-06-26 VITALS — SYSTOLIC BLOOD PRESSURE: 136 MMHG | DIASTOLIC BLOOD PRESSURE: 65 MMHG

## 2020-06-26 NOTE — CN
PATIENT NAME:ALEXEY BAGLEY                                  MEDICAL RECORD: [id number]
: 53                                              LOCATION:D. D.2135
ADMIT DATE: 20                                       ACCOUNT: [id number]
CONSULTING PHYSICIAN:    SANDEEP PITTS MD             
                                               
REFERRING PHYSICIAN:     NATHANIEL BOTELLO DO            
 
 
DATE OF CONSULTATION:  2020
 
Psychiatric Consultation
 
IDENTIFYING DATA:  The patient is 64 years old and she is admitted to the
hospital secondary to hyperglycemia.
 
CHIEF COMPLAINT:  Confusion.
 
HISTORY OF PRESENT ILLNESS:  The patient is indeed confused, but apparently
improving.  She has some depressive symptoms, but no thoughts of harming herself
or others.  She has no psychotic symptoms.  She does have a history of
psychiatric disease that includes depression and overdose.  I am in agreement
with her current psychiatric medications and would not prescribe a
benzodiazepine to her when she leaves the hospital.  If it is something that
would help manage her here and assist her with sleep consolidation in the
hospital, I have no issues with this.  It will be important that she have follow
up on an outpatient basis for mental health.
 
ASSESSMENT:
1. Adjustment disorder with mixed emotional features.
2. History of major depression.
3. History of overdose.
 
PLAN:  As above, the patient should be maintained on current medicines.  I
suspect her cognition will improve as her hyperglycemia resolves.  I do not see
evidence of acute or direct dangerousness.  I do not think she needs a sitter
currently.
 
TRANSINT:[id number] Voice Confirmation ID: [id number] DOCUMENT ID: [id number]
                                           
                                           SANDEEP PITTS MD             
 
 
 
Electronically Signed by SANDEEP PITTS on 20 at 0952
 
 
 
 
 
 
CC:                                                             [id number]
DICTATION DATE: 20     :     20 2320      ADM IN  
                                                                              
[place] [id number] [place] [phone]

## 2020-06-26 NOTE — MORECARE
CASE MANAGEMENT DISCHARGE SUMMARY
 
 
PATIENT: ALEXEY BAGLEY                        UNIT: [id number]
ACCOUNT#: [id number]                       ADM DATE: 20
AGE: 64     : 53  SEX: F            ROOM/BED: D.0747    
AUTHOR: JASMYNE EPPERSON                             PHYSICIAN:                               
 
REFERRING PHYSICIAN: NATHANIEL BOTELLO DO            
DATE OF SERVICE: 20
Discharge Plan
 
 
Patient Name: ALEXEY BAGLEY
[facility]
Encounter #: [id number]
Medical Record #: [id number]
: 1955
Planned Disposition: 
Anticipated Discharge Date: 
 
Discharge Date: 
Expected LOS: 
Initial Reviewer: [id number]
Initial Review Date: 2020
Generated: 20   2:48 pm 
  
 
 
External Providers
External Provider: OTHER-OTHER
 
Next Contact Date: 
Service Request Date: 
Service Type: 
Resolution: 
 
Reviewer: 
Comments: 
 
 
 
 
 
Patient Name: ALEXEY BAGLEY
 
Encounter #: [id number]
Page [id number]
 
 
 
 
 
Electronically Signed by JASMYNE EPPERSON on 20 at 1349
 
 
 
 
 
 
**All edits/amendments must be made on the electronic document**
 
DICTATION DATE: 20 1348     : SENDY  20 1348     
RPT#: [id number]                                DC DATE:        
                                               STATUS: ADM IN  
[facility]
[address] [place] [address]
***END OF REPORT***

## 2020-06-26 NOTE — NUR
PT DISCHARGED HOME VIA WHEELCHAIR WITH FAMILY. PIV REMOVED WITH CATHETER TIP
FULLY INTACT. PT SIGNED PROPER DISCHARGE INSTRUCTIONS AND REMOVED ALL
VALUABLES FROM THE ROOM. TELEMETRY REMOVED AND RETURNED.

## 2020-06-27 NOTE — MORECARE
CASE MANAGEMENT DISCHARGE SUMMARY
 
 
PATIENT: ALEXEY BAGLEY                        UNIT: [id number]
ACCOUNT#: [id number]                       ADM DATE: 20
AGE: 64     : 53  SEX: F            ROOM/BED: D.5952    
AUTHOR: JASMNYE EPPERSON                             PHYSICIAN:                               
 
REFERRING PHYSICIAN: NATHANIEL BOTELLO DO            
DATE OF SERVICE: 20
Discharge Plan
 
 
Patient Name: ALEXEY BAGLEY
[facility]
Encounter #: [id number]
Medical Record #: [id number]
: 1955
Planned Disposition: 
Anticipated Discharge Date: 
 
Discharge Date: 2020
Expected LOS: 
Initial Reviewer: [email or web address]
Initial Review Date: 2020
Generated: 20  11:23 am 
  
 
 
 
 
 
 
 
 
Last DP export: 20  12:49 p
Patient Name: ALEXEY BAGLEY
 
Encounter #: [id number]
Page [id number]
 
 
 
 
 
Electronically Signed by JASMYNE EPPERSON on 20 at 1023
 
 
 
 
 
 
**All edits/amendments must be made on the electronic document**
 
DICTATION DATE: 20 1023     : DM  20 1023     
RPT#: [id number]                                DC DATE:20
                                               STATUS: DIS IN  
[facility]
[address] [place] [address]
***END OF REPORT***

## 2020-08-11 ENCOUNTER — HOSPITAL ENCOUNTER (EMERGENCY)
Dept: HOSPITAL 84 - D.ER | Age: 65
Discharge: HOME | End: 2020-08-11
Payer: COMMERCIAL

## 2020-08-11 VITALS — WEIGHT: 100.21 LBS | BODY MASS INDEX: 18.44 KG/M2 | HEIGHT: 62 IN

## 2020-08-11 VITALS — SYSTOLIC BLOOD PRESSURE: 126 MMHG | DIASTOLIC BLOOD PRESSURE: 77 MMHG

## 2020-08-11 DIAGNOSIS — Z86.73: ICD-10-CM

## 2020-08-11 DIAGNOSIS — Z79.4: ICD-10-CM

## 2020-08-11 DIAGNOSIS — E11.40: ICD-10-CM

## 2020-08-11 DIAGNOSIS — I10: ICD-10-CM

## 2020-08-11 DIAGNOSIS — J02.9: ICD-10-CM

## 2020-08-11 DIAGNOSIS — I25.2: ICD-10-CM

## 2020-08-11 DIAGNOSIS — J01.00: Primary | ICD-10-CM

## 2020-08-11 DIAGNOSIS — Z72.0: ICD-10-CM

## 2020-08-13 ENCOUNTER — HOSPITAL ENCOUNTER (EMERGENCY)
Dept: HOSPITAL 84 - D.ER | Age: 65
Discharge: HOME | End: 2020-08-13
Payer: COMMERCIAL

## 2020-08-13 VITALS — SYSTOLIC BLOOD PRESSURE: 129 MMHG | DIASTOLIC BLOOD PRESSURE: 70 MMHG

## 2020-08-13 VITALS — BODY MASS INDEX: 17.52 KG/M2 | HEIGHT: 62 IN | WEIGHT: 95.2 LBS

## 2020-08-13 DIAGNOSIS — Z95.5: ICD-10-CM

## 2020-08-13 DIAGNOSIS — I25.2: ICD-10-CM

## 2020-08-13 DIAGNOSIS — I10: ICD-10-CM

## 2020-08-13 DIAGNOSIS — J44.1: Primary | ICD-10-CM

## 2020-08-13 DIAGNOSIS — R05: ICD-10-CM

## 2020-08-13 DIAGNOSIS — E11.40: ICD-10-CM

## 2020-08-13 DIAGNOSIS — J32.9: ICD-10-CM

## 2020-08-13 DIAGNOSIS — R50.9: ICD-10-CM

## 2020-08-13 DIAGNOSIS — Z79.4: ICD-10-CM

## 2020-08-13 LAB
ALBUMIN SERPL-MCNC: 3.2 G/DL (ref 3.4–5)
ALP SERPL-CCNC: 121 U/L (ref 30–120)
ALT SERPL-CCNC: 20 U/L (ref 10–68)
AMYLASE SERPL-CCNC: 40 U/L (ref 25–115)
ANION GAP SERPL CALC-SCNC: 14.2 MMOL/L (ref 8–16)
BASOPHILS NFR BLD AUTO: 0.1 % (ref 0–2)
BILIRUB SERPL-MCNC: 0.26 MG/DL (ref 0.2–1.3)
BILIRUB SERPL-MCNC: NEGATIVE MG/DL
BUN SERPL-MCNC: 7 MG/DL (ref 7–18)
CALCIUM SERPL-MCNC: 9.2 MG/DL (ref 8.5–10.1)
CHLORIDE SERPL-SCNC: 104 MMOL/L (ref 98–107)
CK MB SERPL-MCNC: 1.3 U/L (ref 0–3.6)
CK SERPL-CCNC: 41 UL (ref 21–215)
CO2 SERPL-SCNC: 24.4 MMOL/L (ref 21–32)
CREAT SERPL-MCNC: 0.8 MG/DL (ref 0.6–1.3)
EOSINOPHIL NFR BLD: 1.8 % (ref 0–7)
ERYTHROCYTE [DISTWIDTH] IN BLOOD BY AUTOMATED COUNT: 12.7 % (ref 11.5–14.5)
GLOBULIN SER-MCNC: 3.7 G/L
GLUCOSE SERPL-MCNC: 149 MG/DL (ref 74–106)
GLUCOSE SERPL-MCNC: 50 MG/DL
HCT VFR BLD CALC: 40.5 % (ref 36–48)
HGB BLD-MCNC: 13.7 G/DL (ref 12–16)
IMM GRANULOCYTES NFR BLD: 0.4 % (ref 0–5)
KETONES UR STRIP-MCNC: NEGATIVE MG/DL
LIPASE SERPL-CCNC: 22 U/L (ref 73–393)
LYMPHOCYTES NFR BLD AUTO: 27.2 % (ref 15–50)
MCH RBC QN AUTO: 32 PG (ref 26–34)
MCHC RBC AUTO-ENTMCNC: 33.8 G/DL (ref 31–37)
MCV RBC: 94.6 FL (ref 80–100)
MONOCYTES NFR BLD: 7.2 % (ref 2–11)
NEUTROPHILS NFR BLD AUTO: 63.3 % (ref 40–80)
NITRITE UR-MCNC: NEGATIVE MG/ML
OSMOLALITY SERPL CALC.SUM OF ELEC: 278 MOSM/KG (ref 275–300)
PH UR STRIP: 5 [PH] (ref 5–6)
PLATELET # BLD: 309 10X3/UL (ref 130–400)
PMV BLD AUTO: 9.9 FL (ref 7.4–10.4)
POTASSIUM SERPL-SCNC: 3.6 MMOL/L (ref 3.5–5.1)
PROT SERPL-MCNC: 6.9 G/DL (ref 6.4–8.2)
RBC # BLD AUTO: 4.28 10X6/UL (ref 4–5.4)
SODIUM SERPL-SCNC: 139 MMOL/L (ref 136–145)
TROPONIN I SERPL-MCNC: < 0.017 NG/ML (ref 0–0.06)
UROBILINOGEN UR-MCNC: NORMAL MG/DL
WBC # BLD AUTO: 10.4 10X3/UL (ref 4.8–10.8)

## 2020-08-23 ENCOUNTER — HOSPITAL ENCOUNTER (EMERGENCY)
Dept: HOSPITAL 84 - D.ER | Age: 65
Discharge: HOME | End: 2020-08-23
Payer: COMMERCIAL

## 2020-08-23 VITALS — WEIGHT: 95.2 LBS | HEIGHT: 62 IN | BODY MASS INDEX: 17.52 KG/M2

## 2020-08-23 VITALS — SYSTOLIC BLOOD PRESSURE: 117 MMHG | DIASTOLIC BLOOD PRESSURE: 70 MMHG

## 2020-08-23 DIAGNOSIS — E11.40: ICD-10-CM

## 2020-08-23 DIAGNOSIS — I10: ICD-10-CM

## 2020-08-23 DIAGNOSIS — I25.2: ICD-10-CM

## 2020-08-23 DIAGNOSIS — E11.65: Primary | ICD-10-CM

## 2020-08-23 DIAGNOSIS — Z91.19: ICD-10-CM

## 2020-08-23 DIAGNOSIS — Z79.4: ICD-10-CM

## 2020-08-23 DIAGNOSIS — Z86.73: ICD-10-CM

## 2020-08-23 DIAGNOSIS — E87.1: ICD-10-CM

## 2020-08-23 DIAGNOSIS — R07.9: ICD-10-CM

## 2020-08-23 DIAGNOSIS — Z72.0: ICD-10-CM

## 2020-08-23 LAB
ALBUMIN SERPL-MCNC: 3.2 G/DL (ref 3.4–5)
ALP SERPL-CCNC: 204 U/L (ref 30–120)
ALT SERPL-CCNC: 30 U/L (ref 10–68)
AMPHETAMINES UR QL SCN: NEGATIVE QUAL
ANION GAP SERPL CALC-SCNC: 9.6 MMOL/L (ref 8–16)
APTT BLD: 27.2 SECONDS (ref 22.8–39.4)
BARBITURATES UR QL SCN: NEGATIVE QUAL
BASOPHILS NFR BLD AUTO: 0.3 % (ref 0–2)
BENZODIAZ UR QL SCN: NEGATIVE QUAL
BILIRUB SERPL-MCNC: 0.17 MG/DL (ref 0.2–1.3)
BILIRUB SERPL-MCNC: NEGATIVE MG/DL
BUN SERPL-MCNC: 10 MG/DL (ref 7–18)
BZE UR QL SCN: NEGATIVE QUAL
CALCIUM SERPL-MCNC: 9.7 MG/DL (ref 8.5–10.1)
CANNABINOIDS UR QL SCN: POSITIVE QUAL
CHLORIDE SERPL-SCNC: 93 MMOL/L (ref 98–107)
CK MB SERPL-MCNC: 0.9 U/L (ref 0–3.6)
CK SERPL-CCNC: 38 UL (ref 21–215)
CO2 SERPL-SCNC: 30 MMOL/L (ref 21–32)
CREAT SERPL-MCNC: 1 MG/DL (ref 0.6–1.3)
D DIMER PPP FEU-MCNC: 0.53 UG/MLFEU (ref 0.2–0.54)
EOSINOPHIL NFR BLD: 5.7 % (ref 0–7)
ERYTHROCYTE [DISTWIDTH] IN BLOOD BY AUTOMATED COUNT: 12.6 % (ref 11.5–14.5)
GLOBULIN SER-MCNC: 3.8 G/L
GLUCOSE SERPL-MCNC: 1000 MG/DL
GLUCOSE SERPL-MCNC: 625 MG/DL (ref 74–106)
HCT VFR BLD CALC: 42 % (ref 36–48)
HGB BLD-MCNC: 13.9 G/DL (ref 12–16)
IMM GRANULOCYTES NFR BLD: 0.5 % (ref 0–5)
INR PPP: 0.93 (ref 0.85–1.17)
KETONES UR STRIP-MCNC: NEGATIVE MG/DL
LYMPHOCYTES NFR BLD AUTO: 25.1 % (ref 15–50)
MAGNESIUM SERPL-MCNC: 1.8 MG/DL (ref 1.8–2.4)
MCH RBC QN AUTO: 31.8 PG (ref 26–34)
MCHC RBC AUTO-ENTMCNC: 33.1 G/DL (ref 31–37)
MCV RBC: 96.1 FL (ref 80–100)
MONOCYTES NFR BLD: 5.1 % (ref 2–11)
NEUTROPHILS NFR BLD AUTO: 63.3 % (ref 40–80)
NITRITE UR-MCNC: NEGATIVE MG/ML
OPIATES UR QL SCN: NEGATIVE QUAL
OSMOLALITY SERPL CALC.SUM OF ELEC: 284 MOSM/KG (ref 275–300)
PCP UR QL SCN: NEGATIVE QUAL
PH UR STRIP: 6 [PH] (ref 5–6)
PLATELET # BLD: 304 10X3/UL (ref 130–400)
PMV BLD AUTO: 10 FL (ref 7.4–10.4)
POTASSIUM SERPL-SCNC: 4.6 MMOL/L (ref 3.5–5.1)
PROT SERPL-MCNC: 7 G/DL (ref 6.4–8.2)
PROTHROMBIN TIME: 12.4 SECONDS (ref 11.6–15)
RBC # BLD AUTO: 4.37 10X6/UL (ref 4–5.4)
SODIUM SERPL-SCNC: 128 MMOL/L (ref 136–145)
TROPONIN I SERPL-MCNC: < 0.017 NG/ML (ref 0–0.06)
UROBILINOGEN UR-MCNC: NORMAL MG/DL
WBC # BLD AUTO: 9.9 10X3/UL (ref 4.8–10.8)

## 2020-08-29 ENCOUNTER — HOSPITAL ENCOUNTER (INPATIENT)
Dept: HOSPITAL 84 - D.ER | Age: 65
LOS: 17 days | Discharge: NURSING, REHAB OR LONG-TERM CARE | DRG: 246 | End: 2020-09-15
Attending: FAMILY MEDICINE | Admitting: FAMILY MEDICINE
Payer: COMMERCIAL

## 2020-08-29 VITALS — SYSTOLIC BLOOD PRESSURE: 105 MMHG | DIASTOLIC BLOOD PRESSURE: 51 MMHG

## 2020-08-29 VITALS — SYSTOLIC BLOOD PRESSURE: 118 MMHG | DIASTOLIC BLOOD PRESSURE: 64 MMHG

## 2020-08-29 VITALS
BODY MASS INDEX: 19.06 KG/M2 | BODY MASS INDEX: 19.06 KG/M2 | HEIGHT: 62 IN | BODY MASS INDEX: 19.06 KG/M2 | WEIGHT: 103.6 LBS

## 2020-08-29 VITALS — SYSTOLIC BLOOD PRESSURE: 143 MMHG | DIASTOLIC BLOOD PRESSURE: 78 MMHG

## 2020-08-29 VITALS — DIASTOLIC BLOOD PRESSURE: 57 MMHG | SYSTOLIC BLOOD PRESSURE: 116 MMHG

## 2020-08-29 VITALS — DIASTOLIC BLOOD PRESSURE: 54 MMHG | SYSTOLIC BLOOD PRESSURE: 120 MMHG

## 2020-08-29 VITALS — SYSTOLIC BLOOD PRESSURE: 121 MMHG | DIASTOLIC BLOOD PRESSURE: 69 MMHG

## 2020-08-29 VITALS — DIASTOLIC BLOOD PRESSURE: 56 MMHG | SYSTOLIC BLOOD PRESSURE: 106 MMHG

## 2020-08-29 VITALS — SYSTOLIC BLOOD PRESSURE: 143 MMHG | DIASTOLIC BLOOD PRESSURE: 62 MMHG

## 2020-08-29 VITALS — SYSTOLIC BLOOD PRESSURE: 115 MMHG | DIASTOLIC BLOOD PRESSURE: 62 MMHG

## 2020-08-29 DIAGNOSIS — I24.9: ICD-10-CM

## 2020-08-29 DIAGNOSIS — Z79.4: ICD-10-CM

## 2020-08-29 DIAGNOSIS — E11.40: ICD-10-CM

## 2020-08-29 DIAGNOSIS — E11.10: ICD-10-CM

## 2020-08-29 DIAGNOSIS — I95.9: ICD-10-CM

## 2020-08-29 DIAGNOSIS — I25.10: Primary | ICD-10-CM

## 2020-08-29 DIAGNOSIS — Z89.512: ICD-10-CM

## 2020-08-29 DIAGNOSIS — E87.1: ICD-10-CM

## 2020-08-29 DIAGNOSIS — E11.51: ICD-10-CM

## 2020-08-29 DIAGNOSIS — Z72.0: ICD-10-CM

## 2020-08-29 DIAGNOSIS — I10: ICD-10-CM

## 2020-08-29 DIAGNOSIS — Z86.73: ICD-10-CM

## 2020-08-29 LAB
ALBUMIN SERPL-MCNC: 3.4 G/DL (ref 3.4–5)
ALP SERPL-CCNC: 182 U/L (ref 30–120)
ALT SERPL-CCNC: 18 U/L (ref 10–68)
ANION GAP SERPL CALC-SCNC: 11.5 MMOL/L (ref 8–16)
ANION GAP SERPL CALC-SCNC: 18.4 MMOL/L (ref 8–16)
APTT BLD: 27.7 SECONDS (ref 22.8–39.4)
BASOPHILS NFR BLD AUTO: 0.5 % (ref 0–2)
BILIRUB SERPL-MCNC: 0.4 MG/DL (ref 0.2–1.3)
BUN SERPL-MCNC: 12 MG/DL (ref 7–18)
BUN SERPL-MCNC: 17 MG/DL (ref 7–18)
CALCIUM SERPL-MCNC: 8 MG/DL (ref 8.5–10.1)
CALCIUM SERPL-MCNC: 9.2 MG/DL (ref 8.5–10.1)
CHLORIDE SERPL-SCNC: 104 MMOL/L (ref 98–107)
CHLORIDE SERPL-SCNC: 87 MMOL/L (ref 98–107)
CK MB SERPL-MCNC: 1.1 U/L (ref 0–3.6)
CK MB SERPL-MCNC: 1.5 U/L (ref 0–3.6)
CK SERPL-CCNC: 34 UL (ref 21–215)
CK SERPL-CCNC: 51 UL (ref 21–215)
CO2 SERPL-SCNC: 18.4 MMOL/L (ref 21–32)
CO2 SERPL-SCNC: 23.9 MMOL/L (ref 21–32)
CREAT SERPL-MCNC: 0.6 MG/DL (ref 0.6–1.3)
CREAT SERPL-MCNC: 1.1 MG/DL (ref 0.6–1.3)
EOSINOPHIL NFR BLD: 6 % (ref 0–7)
ERYTHROCYTE [DISTWIDTH] IN BLOOD BY AUTOMATED COUNT: 12.6 % (ref 11.5–14.5)
GLOBULIN SER-MCNC: 3.9 G/L
GLUCOSE SERPL-MCNC: 847 MG/DL (ref 74–106)
GLUCOSE SERPL-MCNC: 88 MG/DL (ref 74–106)
HCT VFR BLD CALC: 44.1 % (ref 36–48)
HGB BLD-MCNC: 14.6 G/DL (ref 12–16)
IMM GRANULOCYTES NFR BLD: 0.3 % (ref 0–5)
INR PPP: 1.06 (ref 0.85–1.17)
LYMPHOCYTES NFR BLD AUTO: 28.8 % (ref 15–50)
MAGNESIUM SERPL-MCNC: 1.9 MG/DL (ref 1.8–2.4)
MCH RBC QN AUTO: 31.6 PG (ref 26–34)
MCHC RBC AUTO-ENTMCNC: 33.1 G/DL (ref 31–37)
MCV RBC: 95.5 FL (ref 80–100)
MONOCYTES NFR BLD: 6.1 % (ref 2–11)
NEUTROPHILS NFR BLD AUTO: 58.3 % (ref 40–80)
OSMOLALITY SERPL CALC.SUM OF ELEC: 270 MOSM/KG (ref 275–300)
OSMOLALITY SERPL CALC.SUM OF ELEC: 285 MOSM/KG (ref 275–300)
PLATELET # BLD: 379 10X3/UL (ref 130–400)
PMV BLD AUTO: 10.4 FL (ref 7.4–10.4)
POTASSIUM SERPL-SCNC: 3.4 MMOL/L (ref 3.5–5.1)
POTASSIUM SERPL-SCNC: 3.8 MMOL/L (ref 3.5–5.1)
PROT SERPL-MCNC: 7.3 G/DL (ref 6.4–8.2)
PROTHROMBIN TIME: 13.8 SECONDS (ref 11.6–15)
RBC # BLD AUTO: 4.62 10X6/UL (ref 4–5.4)
SODIUM SERPL-SCNC: 120 MMOL/L (ref 136–145)
SODIUM SERPL-SCNC: 136 MMOL/L (ref 136–145)
TROPONIN I SERPL-MCNC: < 0.017 NG/ML (ref 0–0.06)
TROPONIN I SERPL-MCNC: < 0.017 NG/ML (ref 0–0.06)
WBC # BLD AUTO: 10.4 10X3/UL (ref 4.8–10.8)

## 2020-08-29 NOTE — HEMODYNAMI
PATIENT:ALEXEY BAGLEY                                     MEDICAL RECORD: [id number]
: 53                                            [facility]     D[facility]
ACCT# [id number]                                       ADMISSION DATE: 20
 
 
 Generatedon:202011:02
Patient name: ALXEEY BAGLEY Patient #: [id number] Visit #: [id number] SSN: [id number]
[id number] :
1955 Date of study: 2020
Page: Of
Hemodynamic Procedure Report
****************************
Patient Data
Patient Demographics
Procedure consent was obtained
First Name: ALEXEY         Gender: Female
Last Name: KEVYN             : 1955
Patient #: [id number]       Age: 64 year(s)
Visit #: [id number]       Race: 
SSN: [id number]
Accession #:
[id number]
Additional ID: [id number]
Contact details
Address: [place] [address]          Phone: [phone]
State: [place]
City: [place]
Zip code: [address]
Past Medical History
Allergies
Allergen        Reaction        Date         Comments
Reported
Other allergy                   2020    CODEINE
Other allergy                   2020     CODEINE
Admission
Admission Data
Admission Date: 2020   Admission Time: 12:14
Arrival Date: 2020     Arrival Time: 0:00
Admit Source: Other         Insurance Payor: Private
Room #: D.2101              health insurance
[facility] #: [id number]
Height (in.): 62            BSA: 1.45 (m2)
Height (cm.): 157.48        BMI: 18.99 (kg/m2)
Weight (lbs.): 103.82
Weight (kg.): 47.09
Lab Results
Lab Result Date: 2020   Lab Result Time: 0:00
Biochemistry
Name         Units    Result                Min      Max
BUN          mg/dl    6        -*(----)--   7        18
CK-MB        ng/ml    386.1    --(----)-*   0        3.6
Creatinine   mg/dl    0.7      --(*---)--   0.6      1.3
eGFR         ml/min   88.26370 -*(----)--   90       120
NONAFRICAN
Troponin l   ng/ml    47.873   --(----)-*   0        0.06
CBC
Name         Units    Result                Min      Max
Hematocrit   %        37.1     *-(----)--   42       54
Hemoglobin   g/dl     12.4     *-(----)--   13.5     17.5
 
Procedure
Procedure Types
Cath Procedure
Diagnostic Procedure
[facility] w/Coronaries
Sedation Charges
Moderate Sedation up to 30 minutes
PCI Procedure
PTCA
PTCA Initial x2
Hemochron ACT Test
Peripheral Cath Diagnostic Procedure
Abd/Extremity
Aortagram
Procedure Description
Procedure Date
Procedure Date: 2020
Procedure Start Time: 10:07
Procedure End Time: 10:56
Procedure Staff
Name                            Function
Trell Neumann RN                Nurse
Maciel Atkinson MD              Performing Physician
Sydnee Bacon RT               Circulator
Yenny Wright RT               Scrub
Mariluz Norris RT                Monitor
Procedure Data
Cath Procedure
Fluoroscopy
Diagnostic fluoroscopy      Total fluoroscopy Time:
time: 13.1 min              13.1 min
Diagnostic fluoroscopy      Total fluoroscopy dose: 721
dose: 721 mGy               mGy
Contrast Material
Contrast Material Type                       Amount (ml)
Isovue 370                                   122
Entry Location
Entry     Primary  Successful  Side  Size  Upsize Upsize Entry    Closure Succes
sful  Closure
Location                             (Fr)  1 (Fr) 2 (Fr) Remarks  Device        
      Remarks
Femoral                        Right 6 Fr
artery                               Short
Femoral                        Right 4 Fr
vein
Femoral                        Right       6 Fr   6 Fr            Exoseal
artery                                     Long   Short
Estimated blood loss: 10 ml
Diagnostic catheters
Device Type               Used For           End Catheter
Placement
MULTIPACK JL 4.0 5Fr      Procedure
catheter
MULTIPACK 3DRC 5Fr        Procedure
catheter
MULTIPACK Pigtail 5 Fr    Procedure
catheter
Procedure Complications
No complications
 
Procedure Medications
Medication           Administration Route Dosage
Oxygen               etCO2 Nasal cannula  2 l/min
Heparin Flush Bag    added to field       2 bags
(1000units/500ml NS)
Lidocaine 2%         added to field       20
0.9% NaCl            I.V.                 100 ml/hr
Benadryl             I.V.                 50 mg
Fentanyl             I.V.                 50 mcg
Versed               I.V.                 1 mg
Fentanyl             I.V.                 50 mcg
Versed               I.V.                 1 mg
Heparin Bolus        I.V.                 4000 units
Integrilin (Bolus    I.V.                 4 ml
2mg/ml)
Integrilin (Bolus    I.C.                 4 ml
2mg/ml)
Amiodarone Loading   I.V. drip            150 mg
Dose (150mg/100ml
D5W)
Integrilin Drip      I.V. drip            7.4 ml/hr
(75mg/100ml)
Integrilin (Bolus    wasted               2 ml
2mg/ml)
Hemodynamics
Rest
BSA: 1.45 (m2) HGB: 12.4 (g/dl) O2 Consumption: Estimated: 180.73 (ml/min) O2 Co
nsumption
indexed: Estimated:124.64 (ml/min/m) Heart Rate: 153 (bpm)
Pressure Samples
Time     Site     Value (mmHg) Purpose      Heart      Use
Rate(bpm)
10:27    LV       122/51,23    Snapshot     123
Gradients
Valve  Time  Site Site Mean    SEP/DFP    Peak To Heart  Use
1    2    (mmHg)  (sec/min)  Peak    Rate
(mmHg)  (bpm)
Aortic 10:28 LV   AO                              138
Snapshots
Pre Cath      Intra         NCS           Post Cath
Vital Signs
Time     Heart  Resp   SPO2 etCO2   NIBP       Rhythm  Pain    Sedation
Rate   (ipm)  (%)  (mmHg)  (mmHg)             Status  Level
(bpm)
9:48:15  112    17     95   0       124/77(97) NSR     0 (11)  10(A)
, No
pain
9:52:10  58     18          0       119/71(92) NSR     0 (11)  10(A)
, No
pain
9:55:44  119    16     95   0       109/74(90) NSR     0 (11)  10(A)
, No
pain
9:59:29  98     17          0       117/77(93) NSR     0 (11)  10(A)
, No
pain
10:03:02 44     17     95   0       107/77(93) NSR     0 (11)  10(A)
, No
pain
10:06:57 106    16     97   0       123/65(95) NSR     0 (11)  10(A)
 
, No
pain
10:10:30 128    9      98   0       109/77(94) NSR     0 (11)  10(A)
, No
pain
10:14:18 69     8           0       117/78(98) NSR     0 (11)  10(A)
, No
pain
10:18:11 125    16          0       118/67(95) NSR     0 (11)  10(A)
, No
pain
10:21:44 122    10     100  0       111/78(95) NSR     0 (11)  10(A)
, No
pain
10:25:30 24     22     100  0       109/71(89) NSR     0 (11)  10(A)
, No
pain
10:29:17 9      14          0       108/68(86) NSR     0 (11)  10(A)
, No
pain
10:32:50 1      15     98   0       91/64(74)  NSR     0 (11)  10(A)
, No
pain
10:36:25 0      14     98   0       86/56(74)  NSR     0 (11)  10(A)
, No
pain
10:39:58 12     14     98   0       82/61(70)  NSR     0 (11)  10(A)
, No
pain
10:44:10 35     16          0       Out of     NSR     0 (11)  10(A)
range              , No
pain
10:47:49 19     19     98   0       137/68(93) NSR     0 (11)  10(A)
, No
pain
10:51:22 7      19     100  0       109/77(95) NSR     0 (11)  10(A)
, No
pain
10:55:07 43     18     99   0       109/71(86) NSR     0 (11)  10(A)
, No
pain
Medications
Time     Medication       Route   Dose  Verified Delivered Reason          Notes
  Effectiveness
by       by
10:12:26 Oxygen           etCO2   2     Maciel Cai    Per physician
Nasal   l/min [facility] Jacob Neumann RN
cannula       MD
10:12:34 Heparin Flush    added   2     Maciel Cai    used for
Bag              to      bags  [facility] Jacob Neumann RN procedure
(1000units/500ml field         MD
NS)
10:12:43 Lidocaine 2%     added   20ml  Maciel Cai    for local
to      vial  St Jacob Neumann RN anesthetic
field         MD
10:12:53 0.9% NaCl        I.V.    100   Maciel Cai    Per physician
ml/hr [facility] Jacob Neumann RN, MD
10:13:02 Benadryl         I.V.    50 mg Maciel Cai    Per physician
[facility] Jacob Neumann RN, MD
10:13:08 Fentanyl         I.V.    50    Maciel Cai    for sedation
[facility]   [facility] Jacob Neumann RN, MD
10:13:17 Versed           I.V.    1 mg  Maciel Cai    for sedation
[facility] Jacob Neumann RN, MD
10:23:07 Fentanyl         I.V.    50    Maciel Cai    for sedation
[facility]   [facility] Jacob Neumann RN, MD
10:23:11 Versed           I.V.    1 mg  Maciel Cai    for sedation
[facility] Jacob Neumann RN, MD
10:29:49 Heparin Bolus    I.V.    4000  Maciel Cai    for
units St Jacob henson MD
10:30:06 Integrilin       I.V.    4 ml  Maciel Cai    for
(Bolus 2mg/ml)                 St Jacob Neumann RN anticoagulation
MD
10:41:20 Integrilin       I.C.    4 ml  Maciel simon
(Bolus 2mg/ml)                 St Jacob Lui   anticoagulation
MD BARRERA
10:48:05 Amiodarone       I.V.    150   Maciel Cai    for arrhythmia
Loading Dose     drip    mg    St Jacob Neumann RN
(150mg/100ml                   MD
D5W)
10:53:33 Integrilin Drip  I.V.    7.4   Maciel simon
(75mg/100ml)     drip    ml/hr St Jacob Neumann RN anticoagulation
MD
10:53:59 Integrilin       wasted  2 ml  Maciel  Trell    for
(Bolus 2mg/ml)                 St Jacob Neumann RN anticoagulation
MD
Procedure Log
Time     Note
9:04:57  Informed consent obtained and on chart
9:05:10  Diagnostic Cath Status : Urgent
9:05:24  Patient Weight : 103.82 lbs
9:05:24  Patient Height : 62 inches
9:09:01  Lab Result : BUN 6 mg/dl
9:09:01  Lab Result : Creatinine 0.7 mg/dl
9:09:01  Lab Result : Hemoglobin 12.4 g/dl
9:09:01  Lab Result : eGFR NONAFRICAN 88.93604 ml/min
9:09:01  Lab Result : CK-.1 ng/ml
9:09:01  Lab Result : Troponin l 47.873 ng/ml
9:09:02  Lab Result : Hematocrit 37.1 %
9:10:25  Admit Source: Other
9:10:33  **ACC** Patient presents with Non-STEMI CCS Anginal
Class 2--Slight limitation of ordinary activity.
9:10:37  Procedure Status Urgent Heart Cath (IP).
9:10:40  Time tracking: Regular hours (M-F 7:00 - 5:00)
9:10:44  Plan of Care:Hemodynamics will remain stable., Cardiac
rhythm will remain stable., Comfort level will be
maintained., Respiratory function will remain
adequate., Patient/ family verbilizes understanding of
procedure., Procedure tolerated without complication.,
Recovers from procedure without complications..
9:10:53  Family unavailable.
9:10:54  Patient NPO since Midnight.
9:11:08  Patient allergic to Other allergyCODEINE
9:11:14  Lab results completed and on chart.
 
9:11:19  Stress Test: no; N/A ?
9:12:45  Alarms reviewed by R. N.
9:12:45  Sharps counted by scrub and verified by R.N.
9:18:35  Pre-procedure instructions explained to patient.
9:18:36  Pre-op teaching completed and patient verbalized
understanding.
9:20:58  Sydnee Bacon RT(R) sent for patient. Start room use.
9:33:06  Patient received from Med II to CCL 2 Alert and
oriented. Tansferred to table in Supine position.
9:33:07  Warm blankets applied, and marcia hugger turned on for
patient comfort.
9:33:07  Correct patient and procedure confirmed by team.
9:33:08  ECG and BP/O2 sat monitors applied to patient.
9:37:09  Is the patient allergic to Iodine/contrast media? No.
9:37:11  Was the patient premedicated? N/A
9:37:14  Is patient on blood thinner?Yes
9:37:22  **ACC** The patient was administered the following
blood thiners within the last 24 hours: **ACC**Plavix
9:37:26  Patient diabetic? Yes.
9:37:28  If diabetic: On Metformin? No
9:37:30  Patient not pregnant. Patient is over age 55.
9:37:30  ----Pre-sedation anethsthesia assessment.----
9:37:34  Previous problem with sedation/anesthesia? No ?
9:37:35  Snore? Yes
9:37:36  Sleep apnea? No
9:37:38  Deviated septum? No
9:37:39  Opens mouth fully? Yes
9:37:40  Sticks out tongue? Yes
9:37:42  Airway obstruction? No ?
9:37:44  Dentures? No ?
9:37:51  Patient pain scale 4/10 CHEST.
9:38:09  Pre procedure: right dorsailis pedis pulse 2+ Normal;
easily identifiable; not easily obliterated
9:38:22  IV patent on arrival in left antecubital with 0.9%
NaCl at KVO.
9:38:33  Right groin area was prepped with chlora-prep and
draped in sterile fashion
9:38:37  Use device set Femoral Dx
9:38:39  ACIST Syringe (05534) opened to sterile field.
9:38:40  Bag Decanter () opened to sterile field.
9:38:41  Medline Cath Pack ([id number]) opened to sterile field.
9:38:42  ACIST Hand Control (39988) opened to sterile field.
9:38:42  ACIST Manifold (71034) opened to sterile field.
9:38:43  DIAGNOSTIC Multipack 5Fr catheter set ([id number]) opened
to sterile field.
9:38:44  EMERALD Guide Wire ([id number]) opened to sterile field.
9:39:23  Vital chart was started
9:39:25  Full Disclosure recording started
9:44:29  IV left antecubital D/C'd due to site irritation.
9:44:42  IV started by Trell Neumann RN inright hand with a 22
gauge IV catheter with 0.9% NaCl at KVO.
9:52:37  IV right hand D/C'd due to other..
9:53:34  Baseline sample Acquired.
9:59:51  Use device set Femoral Dx
10:00:01 Use device set ST LIU PCI
10:00:11 --------ALL STOP TIME OUT------
10:00:11 Final Timeout: patient, procedure, and site verified
with staff and physician. All members of the team are
in agreement.
10:00:13 Right groin site verified by team.
 
10:00:17 Fire Safety Assessment: A--An alcohol-based skin
anteseptic being used preoperatively., C--Open oxygen
or nitrous oxide is being used., D--An ESU, laser, or
fiber-optic light is being used.
10:00:21 Physical assessment completed. ASA score P 2 - A
patient with mild systemic disease as per Maciel Atkinson MD.
10:00:23 1) 90+ Normal kidney functon but urine findings or
structural abnormalities or genetic trait point to
kidney disease.
10:00:26 Maximum allowable contrast dose (3.7 X eGFR X 0.75)247
ml.
10:00:30 Sedation plan: IV Moderate Sedation Medication:Versed,
Fentanyl
10:01:42 Rhythm: sinus tachycardia, w/ ST elevation
10:02:31 GUIDE 6FR HS I catheter ([id number]) opened to sterile
field.
10:02:32 IV Extension Set opened to sterile field.
10:02:33 IV Extension Set opened to sterile field.
10:06:46 SHEATH 4Fr Precision ([id number]) opened to sterile field.
10:07:43 SHEATH 6FR [place] ([id number]) opened to sterile field.
10:07:48 Procedure started.
10:07:52 Local anesthetic to right femoral artery with
Lidocaine 2% by Trell Neumann RN.**INITIAL ACCESS
ONLY**
10:10:26 A 4 Fr sheath was inserted into the Right Femoral vein
10:11:30 A 6 Fr Short sheath was inserted into the Right
Femoral artery
10:12:26 Oxygen 2 l/min etCO2 Nasal cannula was administered by
Trell Neumann RN; Per physician; Verbal order read
back and verified.
10:12:34 Heparin Flush Bag (1000units/500ml NS) 2 bags added to
field was administered by Trell Neumann RN; used for
procedure; Verbal order read back and verified.
10:12:43 Lidocaine 2% 20ml vial added to field was administered
by Trell Neumann RN; for local anesthetic; Verbal
order read back and verified.
10:12:53 0.9% NaCl 100 ml/hr I.V. was administered by Trell Neumann RN; Per physician; Verbal order read back and
verified.
10:13:02 Benadryl 50 mg I.V. was administered by Trell Neumann
RN; Per physician; Verbal order read back and
verified.
10:13:08 Fentanyl 50 mcg I.V. was administered by Trell Neumann
RN; for sedation; Verbal order read back and verified.
10:13:17 Versed 1 mg I.V. was administered by Trell Neumann RN;
for sedation; Verbal order read back and verified.
10:15:40 GLIDE WIRE Super Stiff Angled 260cm ([id number]) opened to
sterile field.
10:16:00 ANGLED GLIDEWIRE INSERTED.
10:18:58 TORQUE DEVICE PLASTIC .038 ( TD01) opened to sterile
field.
10:20:16 A MULTIPACK JL 4.0 5Fr catheter was advanced over the
wire and used for Procedure.
10:21:01 Abdominal Aortagram was performed.
10:22:39 A sheath was inserted into the Right Femoral artery
10:23:07 Fentanyl 50 mcg I.V. was administered by Trell Neumann
RN; for sedation; Verbal order read back and verified.
10:23:11 Versed 1 mg I.V. was administered by Trell Neumann RN;
for sedation; Verbal order read back and verified.
 
10::27 SHEATH 6FR Destination (RSR01) opened to sterile
field.
10::33 Sheath upsized to a 6 Fr Long.
10:25:01 GOING BACK WITH THE JL4 FOR PROCEDURE.
10:25:08 LCA angiography performed.
10:25:12 Injector settings: Ml/sec: 3, Volume: 6,
10:25:29 Catheter removed.
10:25:37 A MULTIPACK 3DRC 5Fr catheter was advanced over the
wire and used for Procedure.
10:27:00 RCA angiography performed.
10:27:03 Injector settings: Ml/sec: 3, Volume: 6,
10:27:05 Catheter removed.
10:27:09 INFLATOR Merit BasixCompak ([id number]) opened to sterile
field.
10:27:13 WHISPER 300cm guide wire ([id number]) opened to sterile
field.
10:27:26 A MULTIPACK Pigtail 5 Fr catheter was advanced over
the wire and used for Procedure.
10:27:49 LV gram done using [place]
10:28:02 Injector settings: Ml/sec: 10., Volume: 20,
10:28:03 LV hemodynamics recorded.
10:28:11 EF : 25 %
10:28:25 GUIDE 6FR XBLAD 3.5 catheter ([id number]) opened to
sterile field.
10:28:28 Catheter removed.
10:28:29 Proceeding to intervention.
10:29:07 6 Fr XBLAD 3.5 guide catheter was inserted over the
wire
10:29:49 Heparin Bolus 4000 units I.V. was administered by
Trell Neumann RN; for anticoagulation; Verbal order
read back and verified.
10:30:06 Integrilin (Bolus 2mg/ml) 4 ml I.V. was administered
by Trell Neumann RN; for anticoagulation; Verbal order
read back and verified.
10:33:59 Pre PCI Site: Native LAD has 100% stenosis.
10:34:33 **ACC** Pre-intervention JUDE Flow is 0.
10:35:09 WHISPER 300 wire advanced.
10:36:21 Inflate balloon Inflation number: 1 A EMERGE OTW 1.5 x
8 balloon ([id number]) was prepped and advanced across
the Mid , then inflated to 12 SANDHYA for 0:04
(min:sec) .
10:36:45 Inflation number: 2 The EMERGE OTW 1.5 x 8 balloon
([id number]) was reinflated across the Mid LAD , to 12
SANDHYA for 0:00 (min:sec) .
10:37:12 Balloon removed over the wire.
10:39:24 Inflate balloon Inflation number: 3 A EUPHORA 3.0 x 15
Balloon ([id number]) was prepped and advanced across the
Mid LAD , then inflated to 12 SANDHYA for 0:00 (min:sec) .
10:39:48 Inflation number: 4 The EUPHORA 3.0 x 15 Balloon
([id number]) was reinflated across the Mid LAD , to 12
SANDHYA for 0:00 (min:sec) .
10:41:20 Integrilin (Bolus 2mg/ml) 4 ml I.C. was administered
by Maciel Atkinson MD; for anticoagulation; Verbal
order read back and verified.
10:44:01 Inflation number: 5 The EUPHORA 3.0 x 15 Balloon
([id number]) was reinflated across the Mid LAD , to 12
SANDHYA for 0:00 (min:sec) .
10:44:39 Inflation number: 1 The EUPHORA 3.0 x 15 Balloon
([id number]) was reinflated across the Dist LAD , to 6
SANDHYA for 0:00 (min:sec) .
 
10:44:52 Inflation number: 2 The EUPHORA 3.0 x 15 Balloon
([id number]) was reinflated across the Dist LAD , to 6
SANDHYA for 0:00 (min:sec) .
10:45:08 Inflation number: 3 The EUPHORA 3.0 x 15 Balloon
([id number]) was reinflated across the Dist LAD , to 6
SANDHYA for 0:00 (min:sec) .
10:45:25 Inflation number: 4 The EUPHORA 3.0 x 15 Balloon
([id number]) was reinflated across the Dist LAD , to 6
SANDHYA for 0:00 (min:sec) .
10:46:08 Inflation number: 5 The EUPHORA 3.0 x 15 Balloon
([id number]) was reinflated across the Dist LAD , to 8
SANDHYA for 0:00 (min:sec) .
10:46:50 Inflation number: 6 The EUPHORA 3.0 x 15 Balloon
([id number]) was reinflated across the Dist LAD , to 8
SANDHYA for 0:00 (min:sec) .
10:47:47 Inflation number: 7 The EUPHORA 3.0 x 15 Balloon
([id number]) was reinflated across the Dist LAD , to 6
SANDHYA for 0:00 (min:sec) .
10:48:05 Amiodarone Loading Dose (150mg/100ml D5W) 150 mg I.V.
drip was administered by Trell Neumann RN; for
arrhythmia; Verbal order read back and verified.
10:48:05 Balloon removed over the wire.
10:48:06 Wire removed.
10:48:06 Guide catheter removed.
10:48:48 Sheath upsized to a 6 Fr Short.
10:49:02 EXOSEAL 6Fr () opened to sterile field.
10:50:05 Sheath removed intact; hemostasis achieved with
Exoseal to the Right Femoral artery.
10:51:01 Procedure ended.(Physican Out)
10:51:08 Fluoroscopy time 13.10 minutes.
10:51:12 Fluoroscopy dose: 721 mGy
10:51:12 Flurop Dose total: 721
10:51:17 Dose Area Product 36015 mGy/cm.
10:51:32 Contrast amount:Isovue 370 122ml.
10:51:35 Maximum allowable dose exceeded? No.
10:51:36 Sharps counted by scrub and verified by R.N.
10:51:48 Post-op/insertion site Right Femoral artery dressed
using a 4 x 4 and Tegaderm.
10:51:53 Post right femoral artery:stable, soft, clean and dry
10:51:56 Post Procedure Pulses reassessed and unchanged
10:51:59 Post procedure: right dorsailis pedis pulse 2+ Normal;
easily identifiable; not easily obliterated.
10:52:02 Post-procedure physical assessment completed. ASA
score P 2 - A patient with mild systemic disease as
per Maciel Atkinson MD.
10:52:09 Post procedure rhythm: unchanged.
10:52:11 Estimated blood loss: 10 ml
10:52:13 Post procedure instruction explained to
patient.Patient verbalizes understanding.
10:52:13 Patient needs reinforcement of post procedure
teaching.
10:53:33 Integrilin Drip (75mg/100ml) 7.4 ml/hr I.V. drip was
administered by Trell Neumann RN; for anticoagulation;
Verbal order read back and verified.
10:53:59 Integrilin (Bolus 2mg/ml) 2 ml wasted was administered
by Trell Neumann RN; for anticoagulation; Verbal order
read back and verified.
10:54:28 Procedure type changed to Cath procedure, Diagnostic
procedure, LHC, C w/Coronaries, Sedation Charges,
Moderate Sedation up to 30 minutes, PCI procedure,
 
PTCA, PTCA Initial x2, Hemochron ACT Test, Peripheral
Cath Diagnostic Procedure, Abd/Extremity, Aortagram
10:54:36 Procedure Complication : No complications
10:54:43 [facility] Findings: MVD- PCI performed (see procedure note)
10:54:45 Operative report dictated upon procedure completion.
10:54:45 See physician's report for complete and final results.
10:55:11 ACT drawn and resulted at 216 seconds. (normal
therapeutic range 180-240 seconds).
10:56:42 Vital chart was stopped
10:56:45 Report given to ICU.
10:56:48 Patient transfered to ICU with Bed.
10:56:51 Procedure ended.
10:56:51 Full Disclosure recording stopped
10:57:24 **ACC-PCI Only** Patient was given prescriptions, or
instructed by Maciel Atkinson MD to start/continue the
following medications upon discharge: Plavix
10:57:25 End room use (Document Last)
10:57:45 End room use (Document Last)
10:58:26 End room use (Document Last)
Intervention Summary
Intervention Notes
Time     ActionType  Lesion and  Equipment    Action#  Pressure  Duration
Attributes  Used
10:36:21 Inflate     Mid LAD     EMERGE OTW   1        12        00:04
balloon                 1.5 x 8
balloon
([id number])
10:36:45 Reinflate   Mid LAD     EMERGE OTW   2        12        00:00
balloon                 1.5 x 8
balloon
([id number])
10:39:24 Inflate     Mid LAD     EUPHORA 3.0  3        12        00:00
balloon                 x 15 Balloon
([id number])
10:39:48 Reinflate   Mid LAD     EUPHORA 3.0  4        12        00:00
balloon                 x 15 Balloon
([id number])
10:44:01 Reinflate   Mid LAD     EUPHORA 3.0  5        12        00:00
balloon                 x 15 Balloon
([id number])
10:44:39 Reinflate   Dist LAD    EUPHORA 3.0  1        6         00:00
balloon                 x 15 Balloon
([id number])
10:44:52 Reinflate   Dist LAD    EUPHORA 3.0  2        6         00:00
balloon                 x 15 Balloon
([id number])
10:45:08 Reinflate   Dist LAD    EUPHORA 3.0  3        6         00:00
balloon                 x 15 Balloon
([id number])
10:45:25 Reinflate   Dist LAD    EUPHORA 3.0  4        6         00:00
balloon                 x 15 Balloon
([id number])
10:46:08 Reinflate   Dist LAD    EUPHORA 3.0  5        8         00:00
balloon                 x 15 Balloon
([id number])
10:46:50 Reinflate   Dist LAD    EUPHORA 3.0  6        8         00:00
balloon                 x 15 Balloon
([id number])
10:47:47 Reinflate   Dist LAD    EUPHORA 3.0  7        6         00:00
balloon                 x 15 Balloon
 
([id number])
Device Usage
Item Name    Manufacture  Quantity  Catalog Number  Hospital Part    Current Min
imal Lot# /
Charge   Number  Stock   Stock   Serial#
Code
ACIST        Acist        1         24901           [id number]   [id number]  [id number]  20
Syringe      Medical
(65207)      [employer]
Bag Decanter [employer]     1         S           006254   [id number]   [id number]  5
(S)      [employer]
Medline Cath Medline      1         [id number]       [id number]   [id number]   [id number]  5
Pack
([id number])
ACIST Hand   Acist        1         03225           469443   [id number]  [id number]  5
Control      Medical
(36232)      [employer]
ACIST        Acist        1         41747           419325   085956  180810  5
[employer]     Medical
(84285)      [employer]
DIAGNOSTIC   Cardinal     1         CQ8504          416875   43737   389490  30
Multipack    Health
5Fr catheter
set (IZ1452)
EMERALD      Cardinal     1         088-248         908904   281875  277493  5
Guide Wire   Health
(066-422)
GUIDE 6FR HS Medtronic    1         LA6HSI          290473   44313   156167  1
I catheter
(LA6HSI)
IV [facility]      2         81028-58        649346   46308   939167  5
Set
SHEATH 4Fr   Terumo       1          366786   309653  683404  5
Precision
(862783)
SHEATH 6FR   Terumo       1         ZGV391          457694   050356  272360  40
[place]
(IJU556)
GLIDE WIRE   Terumo       1         TH2070          846747   840402  589237  5
Super Stiff
Angled 260cm
(SE7299)
TORQUE       [place]       1         TD01            770264   814700  742508  5
DEVICE       [employer]
PLASTIC .038
( TD01)
MULTIPACK JL Cardinal     1                                          853943  5
4.0 5Fr      Health
catheter
SHEATH 6FR   Terumo       1         RSR01           720348   09662   389841  5
Destination
(RSR01)
MULTIPACK    Cardinal     1                                          974813  5
3DRC 5Fr     Health
catheter
INFLATOR     [employer]        1         TW3584          065499   068918  174352  15
[facility]        Medical
BasixCompak
(YU4117)
WHISPER      [employer]       1         [id number]       187702   068036  737915  5
 
300cm guide  Vascular
wire
([id number])
MULTIPACK    Cardinal     1                                          135452  5
Pigtail 5 Fr Health
catheter
GUIDE 6FR    Cardinal     1         [id number]        307907   644584  474253  10
XBLAD 3.5    Health
catheter
(93851752)
EMERGE OTW   [place]       1         [id number]  121455   588269  070297  5  
     [id number]
1.5 x 8      Scientific
balloon
([id number])
EUPHORA 3.0  Medtronic    1         [id number]        500516   188243  250729  5  
     [id number]
x 15 Balloon
([id number])
EXOSEAL 6Fr  Cardinal     1                    783185   799278  405564  10
()      Health
Signature Audit [place]
Stage           Time           Signature          Unsigned
Intra-Procedure 2020       Mariluz Norris
10:57:45 AM    RT(R)
Intra-Procedure 2020       Trell Neumann RN
10:58:26 AM
Intra-Procedure 2020       Maciel Atkinson MD
11:02:13 AM
Signatures
Nurse : Trell Neumann RN        Signature :
______________________________
Date : ______________ Time :
______________
Performing Physician : Maciel  Signature :
[place] MD                      ______________________________
Date : ______________ Time :
______________
Monitor : Mariluz Young RT      Signature :
______________________________
Date : ______________ Time :
______________
 
 
 
 
 
 
 
 
 
 
 
 
 
[address]                           
[place] [address]

## 2020-08-29 NOTE — NUR
INSULIN DRIP INFUSING ON DEPART TO CVICU ONLY 3 UNITS DELIVERED IN ER.
NS INFUSING ON DEPART TO CVICU 100CC DELIVERED.

## 2020-08-29 NOTE — NUR
REPORT REC'D AND CARE ASSUMED, REC'D PT AWAKE, ALERT, AND WATCHING TV ON ROOM
AIR, LEFT WRIST PIV WITH NS @ 50CC/HR AND D51/2NS @ 50CC/HR, CM-SR, BP STABLE,
PREVIOUS LEFT BKA, PT DENIES PAIN OR NEEDS AT THIS TIME, SR UP X 2, BED IN LOW
POSITION, CALL LIGHT IN REACH.

## 2020-08-29 NOTE — HEMODYNAMI
PATIENT:ALEXEY BAGLEY                                     MEDICAL RECORD: [id number]
: 53                                            [id number]                                       ADMISSION DATE: 20
 
 
 Generatedon:202011:13
Patient name: ALEXEY BAGLEY Patient #: [id number] Visit #: [id number] SSN: [id number]
[id number] : 1955
Date of study: 2020
Page: Of
Hemodynamic Procedure Report
****************************
Patient Data
Patient Demographics
Procedure consent was obtained
First Name: ALEXEY         Gender: Female
Last Name: KEVYN             : 1955
Patient #: [id number]       Age: 64 year(s)
Visit #: [id number]       Race: 
SSN: [id number]
Accession #:
[id number]
Additional ID: [id number]
Contact details
Address: [place] [address]          Phone: [phone]
State: [place]
City: [place]
Zip code: [address]
Past Medical History
Allergies
Allergen        Reaction        Date         Comments
Reported
Other allergy                   2020    CODEINE
Admission
Admission Data
Admission Date: 2020   Admission Time: 12:14
Arrival Date: 2020     Arrival Time: 0:00
Admit Source: Other         Insurance Payor: Private
Room #: D.CV08              health insurance
[facility] #: [id number]
Height (in.): 62            BSA: 1.45 (m2)
Height (cm.): 157.48        BMI: 18.99 (kg/m2)
Weight (lbs.): 103.82
Weight (kg.): 47.09
Lab Results
Lab Result Date: 2020  Lab Result Time: 0:00
Biochemistry
Name         Units    Result                Min      Max
BUN          mg/dl    7        --(*---)--   7        18
CK-MB        ng/ml    1.4      --(-*--)--   0        3.6
Creatinine   mg/dl    0.7      --(*---)--   0.6      1.3
eGFR         ml/min   88.09912 -*(----)--   90       120
NONAFRICAN
Troponin l   ng/ml    0.03     --(-*--)--   0        0.06
CBC
Name         Units    Result                Min      Max
Hematocrit   %        37.1     *-(----)--   42       54
Hemoglobin   g/dl     12.4     *-(----)--   13.5     17.5
Procedure
 
Procedure Types
Cath Procedure
Diagnostic Procedure
[facility] w/Coronaries
Sedation Charges
Moderate Sedation up to 30 minutes
PCI Procedure
Coronary Stent
Coronary Stent Initial
Hemochron ACT Test
Procedure Description
Procedure Date
Procedure Date: 2020
Procedure Start Time: 10:39
Procedure End Time: 11:07
Procedure Staff
Name                            Function
Maciel Atkinson MD              Performing Physician
Nancy Bearden, RN               Nurse
Lucas Sandoval RT                  Monitor
Chanda Bay RT              Scrub
Brandee Back RN             Monitor
Procedure Data
Cath Procedure
Fluoroscopy
Diagnostic fluoroscopy      Total fluoroscopy Time: 3.2
time: 3.2 min               min
Diagnostic fluoroscopy      Total fluoroscopy dose: 212
dose: 212 mGy               mGy
Contrast Material
Contrast Material Type                       Amount (ml)
Isovue 300                                   85
Entry Location
Entry     Primary  Successful  Side  Size  Upsize Upsize Entry    Closure     Godinez
ccessful  Closure
Location                             (Fr)  1 (Fr) 2 (Fr) Remarks  Device        
          Remarks
Radial                         Right 6 Fr                         Mechanical
artery                               Short                        Compression
Estimated blood loss: 10 ml
Diagnostic catheters
Device Type               Used For           End Catheter
Placement
DIAGNOSTIC [place] 110cm 5  Procedure
Fr catheter ([id number])
Procedure Complications
No complications
Procedure Medications
Medication           Administration Route Dosage
Oxygen               etCO2 Nasal cannula  2 l/min
Heparin Flush Bag    added to field       2 bags
(1000units/500ml NS)
Lidocaine 2%         added to field       20
Radial Cocktail      added to field       1 syringe
(Verapamil 2mg/Nitro
400mcg/Heparin
1500units)
0.9% NaCl            I.V.                 100 ml/hr
Fentanyl             I.V.                 50 mcg
 
Versed               I.V.                 1 mg
Fentanyl             I.V.                 25 mcg
Versed               I.V.                 0.5 mg
Radial Cocktail      I.A.                 1 syringe
(Verapamil 2mg/Nitro
400mcg/Heparin
1500units)
Heparin Bolus        I.V.                 4000 units
Integrilin (Bolus    I.V.                 4.5 ml
2mg/ml)
Plavix               P.O.                 600 mg
Hemodynamics
Rest
BSA: 1.45 (m2) HGB: 12.4 (g/dl) O2 Consumption: Estimated: 197.2 (ml/min) O2 Con
sumption indexed:
Estimated:136 (ml/min/m) Heart Rate: 0 (bpm)
Pressure Samples
Time     Site     Value (mmHg) Purpose      Heart      Use
Rate(bpm)
10:44    LV       107/-9,4     Snapshot     43
10:51    AO       104/44(68)   Snapshot     85
Gradients
Valve  Time  Site Site Mean    SEP/DFP    Peak To Heart  Use
1    2    (mmHg)  (sec/min)  Peak    Rate
(mmHg)  (bpm)
Aortic 10:45 LV   AO                              70
Snapshots
Pre Cath      Intra         NCS           Post Cath
Vital Signs
Time     Heart  Resp   SPO2 etCO2   NIBP (mmHg) Rhythm  Pain    Sedation
Rate   (ipm)  (%)  (mmHg)                      Status  Level
(bpm)
10:32:15 114    8      96   0       135/81(112) NSR     0 (11)  10(A)
, No
pain
10:36:23 152    14     94   0       128/88(105) NSR     0 (11)  9(A)
, No
pain
10:40:33 116    11     95   0       113/73(93)  NSR     0 (11)  9(A)
, No
pain
10:44:39 94     12     95   0       83/62(74)   NSR     0 (11)  9(A)
, No
pain
10:48:32 93     17     93   0       100/70(82)  NSR     0 (11)  9(A)
, No
pain
10:52:32 89     12     90   0       111/69(87)  NSR     0 (11)  9(A)
, No
pain
10:56:34 89     20     92   0       117/76(102) NSR     0 (11)  9(A)
, No
pain
11:00:39 90     9      96   0       115/67(91)  NSR     0 (11)  9(A)
, No
pain
11:04:45 87     13     97   0       136/77(96)  NSR     0 (11)  10(A)
, No
pain
Medications
 
Time     Medication       Route   Dose    Verified Delivered Reason          Not
es       Effectiveness
by       by
10:25:08 Oxygen           etCO2   2 l/min Nancy    Nancy     for low 02 sats
Nasal           Monisha Bearden,
cannula         RN       RN
10:25:20 Heparin Flush    added   2 bags  Nancy    Nancy     used for
Bag              to              Monisha Bearden  procedure
(1000units/500ml field           RN       RN
NS)
10:25:31 Lidocaine 2%     added   20ml    Nancy    Maciel   used for
to      vial    St Jacob Bearden   procedure
field           FREDI BARRERA
10:25:48 Radial Cocktail  added   1       Nancy    Maciel   for
(Verapamil       to      syringe St Jacob Bearden   vasodilation
2mg/Nitro        field           RN       MD
400mcg/Heparin
1500units)
10:26:03 0.9% NaCl        I.V.    100     Nancy    Nancy     Per physician
ml/hr   Monisha Bearden RN RN
10:33:05 Fentanyl         I.V.    50 mcg  Nancy    Nancy     for sedation
Monisha Bearden RN RN
10:33:10 Versed           I.V.    1 mg    Nancy    Nancy     for sedation
Monisha Bearedn,
FREDI RAI
10:38:09 Fentanyl         I.V.    25 mcg  Nancy    Nancy     for sedation
Monisha Bearden RN RN
10:41:21 Versed           I.V.    0.5 mg  Nancy    Nancy     for sedation
Monisha Bearden,
FREDI RAI
10:42:35 Radial Cocktail  I.A.    1       Nancy    Maciel   for
(Verapamil               syringe [facility] Jacob Bearden   vasodilation
2mg/Nitro                        RN       MD
400mcg/Heparin
1500units)
10:51:47 Integrilin       I.V.    4.5ml   Nancy    Nancy     for             was
rj
(Bolus 2mg/ml)                   Monisha Bearden,  anticoagulation 5.5ml
RN       RN
10:51:48 Heparin Bolus    I.V.    4000    Nancy    Nancy     for             mar
ified w
units   Monisha Bearden,  anticoagulation karenrn
RN       RN
11:00:30 Plavix           P.O.    600 mg  Nancy    Nancy     for
Monisha Bearden,  antiplatelet
RN       RN        therapy
Procedure Log
Time     Note
9:49:40  Informed consent obtained and on chart
9:49:53  Diagnostic Cath Status : Urgent
9:51:42  Lab Result : Troponin l 0.03 ng/ml
9:51:42  Lab Result : eGFR NONAFRICAN 88.25687 ml/min
9:51:42  Lab Result : CK-MB 1.4 ng/ml
9:51:42  Lab Result : BUN 7 mg/dl
9:51:42  Lab Result : Creatinine 0.7 mg/dl
9:51:42  Lab Result : Hemoglobin 12.4 g/dl
9:51:42  Lab Result : Hematocrit 37.1 %
 
9:51:47  Arrival Date: 2020 12:00:00 AM
9:51:47  Admit Source: Other
9:51:50  Patient Height : 62 inches
9:51:56  Patient Weight : 103.82 lbs
9:52:03  Insurance Payor : Private health insurance
10:05:08 Procedure Status Urgent Heart Cath (IP).
10:05:11 Chanda Bay RT(R) (CV) sent for patient. Start
room use.
10:05:14 Time tracking: Regular hours (M-F 7:00 - 5:00)
10:05:23 Plan of Care:Hemodynamics will remain stable., Cardiac
rhythm will remain stable., Comfort level will be
maintained., Respiratory function will remain
adequate., Patient/ family verbilizes understanding of
procedure., Procedure tolerated without complication.,
Recovers from procedure without complications..
10:13:17 Patient received from Med II to CCL 1 Alert and
oriented. Tansferred to table in Supine position.
10:13:18 Warm blankets applied, and marcia hugger turned on for
patient comfort.
10:13:18 Correct patient and procedure confirmed by team.
10:13:19 ECG and BP/O2 sat monitors applied to patient.
10:13:34 H&P Date Dictated: 2020 Within 30 days and on
chart..
10:25:08 Oxygen 2 l/min etCO2 Nasal cannula was administered by
Nancy Bearden RN; for low 02 sats; Verbal order read
back and verified.
10:25:20 Heparin Flush Bag (1000units/500ml NS) 2 bags added to
field was administered by Nancy Bearden RN; used for
procedure; Verbal order read back and verified.
10:25:31 Lidocaine 2% 20ml vial added to field was administered
by Maciel Atkinson MD; used for procedure; Verbal
order read back and verified.
10:25:48 Radial Cocktail (Verapamil 2mg/Nitro 400mcg/Heparin
1500units) 1 syringe added to field was administered
by Maciel Atkinson MD; for vasodilation; Verbal order
read back and verified.
10:26:03 0.9% NaCl 100 ml/hr I.V. was administered by Nancy Bearden RN; Per physician; Verbal order read back and
verified.
10:30:29 **ACC** Patient presents with Unstable Angina CCS
Anginal Class 2--Slight limitation of ordinary
activity.
10:31:01 Vital chart was started
10:31:02 Baseline sample Acquired.
10:31:06 Rhythm: sinus rhythm
10:31:07 Full Disclosure recording started
10:31:07 Pre-procedure instructions explained to patient.
10:31:08 Pre-op teaching completed and patient verbalized
understanding.
10:31:09 Family unavailable.
10:31:11 Patient NPO since Midnight.
10:31:21 Patient allergic to Other allergyCODEINE
10:31:23 Is the patient allergic to Iodine/contrast media? No.
10:31:25 Is patient on blood thinner?No
10:31:30 Patient diabetic? Yes.
10:31:31 If diabetic: On Metformin? No
10:31:33 Previous problem with sedation/anesthesia? No ?
10:31:33 Snore? Yes
10:31:34 Sleep apnea? No
10:31:35 Deviated septum? No
 
10:31:39 Opens mouth fully? Yes
10:31:40 Sticks out tongue? Yes
10:31:41 Airway obstruction? No ?
10:31:43 Dentures? No ?
10:31:48 Pre procedure: right dorsailis pedis pulse Doppler
10:31:55 Lab results completed and on chart.
10:31:59 Right Radial & Right Groin area was prepped with
chlora-prep and draped in sterile fashion
10:32:00 Alarms reviewed by R. N.
10:32:00 Sharps counted by scrub and verified by R.N.
10:32:02 Use device set Radial Dx or PCI
10:32:04 ACIST Syringe (09724) opened to sterile field.
10:32:04 Medline Cath Pack ([id number]) opened to sterile field.
10:32:05 Bag Decanter (2002S) opened to sterile field.
10:32:05 ACIST Hand Control (97001) opened to sterile field.
10:32:05 ACIST Manifold (41131) opened to sterile field.
10:32:06 Tegaderm 4 x 4 (1626W) opened to sterile field.
10:32:06 MBrace Wrist Support ([id number]) opened to sterile
field.
10:32:07 EMERALD Guide Wire (514-538) opened to sterile field.
10:32:13 SHEATH 6FR RAIN (4846391) opened to sterile field.
10:32:21 Physician arrived
10:32:21 --------ALL STOP TIME OUT------
10:32:22 Final Timeout: patient, procedure, and site verified
with staff and physician. All members of the team are
in agreement.
10:32:23 Right Radial & Right Groin site verified by team.
10:32:26 Fire Safety Assessment: A--An alcohol-based skin
anteseptic being used preoperatively., C--Open oxygen
or nitrous oxide is being used., D--An ESU, laser, or
fiber-optic light is being used.
10:32:31 Physical assessment completed. ASA score P 2 - A
patient with mild systemic disease as per Maciel Atkinson MD.
10:32:39 2) 60-89 Mildly reduced kidney function, and other
findings (as for stage 1) point to kidney disease.
10:32:44 Maximum allowable contrast dose (3.7 X eGFR X 0.75)247
ml.
10:32:47 Sedation plan: IV Moderate Sedation Medication:Versed,
Fentanyl
10:33:05 Fentanyl 50 mcg I.V. was administered by Nancy Bearden RN; for sedation; Verbal order read back and
verified.
10:33:10 Versed 1 mg I.V. was administered by Nancy Bearden RN; for sedation; Verbal order read back and verified.
10:38:09 Fentanyl 25 mcg I.V. was administered by Nancy Bearden RN; for sedation; Verbal order read back and
verified.
10:39:11 Procedure started.
10:39:14 Local anesthetic to right radial artery with Lidocaine
2% by Maciel Atkinson MD.**INITIAL ACCESS ONLY**
10:39:24 A 6 Fr Short sheath was inserted into the Right Radial
artery
10:39:27 Zero performed for pressure channel P1
10:41:21 Versed 0.5 mg I.V. was administered by Nancy Bearden RN; for sedation; Verbal order read back and verified.
10:42:35 Radial Cocktail (Verapamil 2mg/Nitro 400mcg/Heparin
1500units) 1 syringe I.A. was administered by Maciel Atkinson MD; for vasodilation; Verbal order read back
and verified.
 
10:43:58 A DIAGNOSTIC Tiger 110cm 5 Fr catheter (089510) was
advanced over the wire and used for Procedure.
10:44:24 LV gram done using WILL[place]BY
10:44:27 Injector settings: Ml/sec: 5, Volume: 15,
10:44:28 LV hemodynamics recorded.
10:44:57 EF : 55 %
10:45:19 LCA angiography performed.
10:46:35 RCA angiography performed.
10:47:34 Catheter exchanged over wire.
10:49:32 Asahi Minamo 300cm wire opened to sterile field.
10:49:33 INFLATOR Merit BasixCompak (FG0731) opened to sterile
field.
10:49:33 GUIDE 6FR XBLAD 3.5 catheter ([id number]) opened to
sterile field.
10:49:59 Pre PCI Site: Native mLAD has 80% stenosis.
10:50:04 6 Fr XBLAD 3.5 guide catheter was inserted over the
wire
10:51:47 Integrilin (Bolus 2mg/ml) 4.5ml I.V. was administered
by Nancy Bearden RN; for anticoagulation; wasted
5.5ml Verbal order read back and verified.
10:51:48 Heparin Bolus 4000 units I.V. was administered by
Nancy Bearden RN; for anticoagulation; verified carlos eduardo jones rn Verbal order read back and verified.
10:52:00 MINAMO wire advanced.
10:52:01 Wire advanced across lesion.
10:53:05 **ACC** Pre-intervention JUDE Flow is 3.
10:54:18 Place stent Inflation Number: 1 A MARY RX 3.0 x 08
stent ([id number]) was prepped and advanced across
the Mid LAD . The stent was deployed at 14 SANDHYA for
0:30 (min:sec) .
10:55:59 Post PCI Site: Native mLAD has 0% stenosis.
10:56:03 **ACC** Post-intervention JUDE Flow is 3.
10:56:22 Stent catheter was removed intact over wire.
10:57:56 **ACC** Pre-intervention JUDE Flow is 3.
10:58:01 Pre PCI Site: Native pLAD has 80% stenosis.
10:58:47 Place stent Inflation Number: 1 A MARY RX 3.0 x 12
stent ([id number]) was prepped and advanced across
the Prox LAD . The stent was deployed at 14 SANDHYA for
0:30 (min:sec) .
10:58:56 Post PCI Site: Native pLAD has 0% stenosis.
10:58:59 **ACC** Post-intervention JUDE Flow is 3.
10:59:01 Stent catheter was removed intact over wire.
10:59:01 Wire removed.
10:59:02 Guide catheter removed.
10:59:08 ZEPHYR REGULAR TR BAND (897412) opened to sterile
field.
10:59:15 Sheath removed intact; hemostasis achieved with
Mechanical Compression to the Right Radial artery.
10:59:18 Procedure ended.(Physican Out)
11:00:30 Plavix 600 mg P.O. was administered by Nancy Bearden RN; for antiplatelet therapy; Verbal order read back
and verified.
11:01:00 Fluoroscopy time 03.20 minutes.
11::08 Flurop Dose total: 212
11::08 Fluoroscopy dose: 212 mGy
11::16 Dose Area Product 9591 mGy/cm.
11:01:23 Contrast amount:Isovue 300 85ml.
11:01:25 Maximum allowable dose exceeded? No.
11::26 Sharps counted by scrub and verified by R.N.
11:01:29 [place] band inflated with 15cc of air.
 
11:01:30 Insertion/operative site no bleeding no hematoma.
11:01:38 Post right radial artery:stable, soft, clean and dry
11:01:40 Post Procedure Pulses reassessed and unchanged
11:01:43 Post-procedure physical assessment completed. ASA
score P 2 - A patient with mild systemic disease as
per Maciel Atkinson MD.
11:01:45 Post procedure rhythm: unchanged.
11:03:52 Estimated blood loss: 10 ml
11:03:53 Post procedure instruction explained to
patient.Patient verbalizes understanding.
11:03:54 Patient needs reinforcement of post procedure
teaching.
11:04:02 ACT drawn and resulted at 290 seconds. (normal
therapeutic range 180-240 seconds).
11:04:04 Procedure type changed to Cath procedure, Diagnostic
procedure, LHC, LHC w/Coronaries, Sedation Charges,
Moderate Sedation up to 30 minutes, PCI procedure,
Coronary Stent, Coronary Stent Initial, Hemochron ACT
Test
11:04:21 Procedure and supply charges have been captured,
reviewed, submitted and are correct.
11:04:24 Procedure Complication : No complications
11:07:25 Vital chart was stopped
11:07:28 [facility] Findings: MVD- PCI performed (see procedure note)
11:07:30 Operative report dictated upon procedure completion.
11:07:31 See physician's report for complete and final results.
11:07:37 Report given to CVICU.
11:07:40 Patient transfered to CVICU with Stretcher.
11:07:42 Procedure ended.
11:07:42 Full Disclosure recording stopped
11:07:49 **ACC-PCI Only** Patient was given prescriptions, or
instructed by Maciel Atkinson MD to start/continue the
following medications upon discharge: Aspirin, Plavix
11:07:51 End room use (Document Last)
11:10:26 Mariluz Norris RT(R) was relieved by Brandee Back
RN as monitoring person
11:13:38 Brandee Back RN was relieved by Lucas Sandoval RT(R)
as monitoring person
Intervention Summary
Intervention Notes
Time     ActionType  Lesion and  Equipment Used Action#  Pressure  Duration
Attributes
10:54:18 Place stent Mid LAD     MARY RX 3.0 x  1        14        00:30
08 stent
([id number])
10:58:47 Place stent Prox LAD    MARY RX 3.0 x  1        14        00:30
12 stent
([id number])
Device Usage
Item Name      Manufacture  Quantity  Catalog       Hospital Part     Current Mi
nimal Lot# /
Number        Charge   Number   Stock   Stock   Serial#
Code
ACIST Syringe  Acist        1         69823         689233   520463   928433  20
(54584)        Medical
Systems Inc
Medline Cath   Medline      1         [id number]     525631   31144    329848  5
Pack
([id number])
Bag Decanter   Microtek     1         2002S         463644   42710    037989  5
 
(S)        Medical Inc.
ACIST Hand     Acist        1         49883         173323   329380   970044  5
Control        Medical
(80447)        Systems Inc
ACIST Manifold Acist        1         22237         834554   247613   651376  5
(47605)        Medical
Systems Inc
Tegaderm 4 x 4 3M           1         1626W         512687   749467   274679  5
(1626W)
MBrace Wrist   Advanced     1         140-0250-00   023733   74584    018703  5
Support        Vascular
([id number])    Dynamics
EMERALD Guide  Cardinal     1         662-455       739824   881566   226616  5
Wire (580-975) Health
SHEATH 6FR     Cardinal     1         9429334       160752   2568675  581719  5
RAIN (8566889) [facility] Intecc 1         [id number]     497580   9465757  672822  0
300cm wire
INFLATOR Merit Merit        1         BH9868        873779   745912   023567  15
[facility]    Medical
(GU5609)
GUIDE 6FR      Cardinal     1         69919957      504905   335514   693350  10
XBLAD 3.5      Health
catheter
([id number])
MARY RX 3.0 x  Medtronic    1         [id number]  385360   3034605  486397  5 
      [id number]
08 stent
([id number])
MARY RX 3.0 x  Medtronic    1         [id number]  884945   9440054  756895  5 
      [id number]
12 stent
([id number])
ZEPHYR REGULAR Cardinal     1         390987        407447   8404686  192056  5
TR BAND        Surprise Ride
(620844)
DIAGNOSTIC     Terumo       1                691675   704252   765509  5
Tiger 110cm 5
Fr catheter
(507079)
Signature Audit [place]
Stage           Time        Signature      Unsigned
Intra-Procedure 2020   Lucas Sandoval
11:09:45 AM RT(R)
Intra-Procedure 2020   Brandee
11:12:55 AM Nazanin RN
Intra-Procedure 2020   Maciel Bishop
11:13:52 AM Jacob BARRERA
 
 
 
 
 
 
 
[facility]                                          
[address] [place] [address]

## 2020-08-30 VITALS — DIASTOLIC BLOOD PRESSURE: 61 MMHG | SYSTOLIC BLOOD PRESSURE: 128 MMHG

## 2020-08-30 VITALS — DIASTOLIC BLOOD PRESSURE: 55 MMHG | SYSTOLIC BLOOD PRESSURE: 95 MMHG

## 2020-08-30 VITALS — SYSTOLIC BLOOD PRESSURE: 137 MMHG | DIASTOLIC BLOOD PRESSURE: 73 MMHG

## 2020-08-30 VITALS — DIASTOLIC BLOOD PRESSURE: 70 MMHG | SYSTOLIC BLOOD PRESSURE: 127 MMHG

## 2020-08-30 VITALS — SYSTOLIC BLOOD PRESSURE: 94 MMHG | DIASTOLIC BLOOD PRESSURE: 53 MMHG

## 2020-08-30 VITALS — DIASTOLIC BLOOD PRESSURE: 58 MMHG | SYSTOLIC BLOOD PRESSURE: 101 MMHG

## 2020-08-30 VITALS — DIASTOLIC BLOOD PRESSURE: 65 MMHG | SYSTOLIC BLOOD PRESSURE: 112 MMHG

## 2020-08-30 VITALS — DIASTOLIC BLOOD PRESSURE: 62 MMHG | SYSTOLIC BLOOD PRESSURE: 117 MMHG

## 2020-08-30 VITALS — DIASTOLIC BLOOD PRESSURE: 68 MMHG | SYSTOLIC BLOOD PRESSURE: 121 MMHG

## 2020-08-30 VITALS — DIASTOLIC BLOOD PRESSURE: 58 MMHG | SYSTOLIC BLOOD PRESSURE: 120 MMHG

## 2020-08-30 VITALS — DIASTOLIC BLOOD PRESSURE: 53 MMHG | SYSTOLIC BLOOD PRESSURE: 115 MMHG

## 2020-08-30 VITALS — SYSTOLIC BLOOD PRESSURE: 136 MMHG | DIASTOLIC BLOOD PRESSURE: 67 MMHG

## 2020-08-30 VITALS — DIASTOLIC BLOOD PRESSURE: 64 MMHG | SYSTOLIC BLOOD PRESSURE: 132 MMHG

## 2020-08-30 VITALS — SYSTOLIC BLOOD PRESSURE: 107 MMHG | DIASTOLIC BLOOD PRESSURE: 56 MMHG

## 2020-08-30 VITALS — DIASTOLIC BLOOD PRESSURE: 67 MMHG | SYSTOLIC BLOOD PRESSURE: 135 MMHG

## 2020-08-30 VITALS — SYSTOLIC BLOOD PRESSURE: 127 MMHG | DIASTOLIC BLOOD PRESSURE: 76 MMHG

## 2020-08-30 VITALS — DIASTOLIC BLOOD PRESSURE: 57 MMHG | SYSTOLIC BLOOD PRESSURE: 114 MMHG

## 2020-08-30 VITALS — DIASTOLIC BLOOD PRESSURE: 83 MMHG | SYSTOLIC BLOOD PRESSURE: 131 MMHG

## 2020-08-30 VITALS — SYSTOLIC BLOOD PRESSURE: 108 MMHG | DIASTOLIC BLOOD PRESSURE: 60 MMHG

## 2020-08-30 VITALS — SYSTOLIC BLOOD PRESSURE: 99 MMHG | DIASTOLIC BLOOD PRESSURE: 55 MMHG

## 2020-08-30 VITALS — SYSTOLIC BLOOD PRESSURE: 88 MMHG | DIASTOLIC BLOOD PRESSURE: 52 MMHG

## 2020-08-30 VITALS — SYSTOLIC BLOOD PRESSURE: 131 MMHG | DIASTOLIC BLOOD PRESSURE: 70 MMHG

## 2020-08-30 VITALS — DIASTOLIC BLOOD PRESSURE: 50 MMHG | SYSTOLIC BLOOD PRESSURE: 120 MMHG

## 2020-08-30 VITALS — SYSTOLIC BLOOD PRESSURE: 137 MMHG | DIASTOLIC BLOOD PRESSURE: 65 MMHG

## 2020-08-30 LAB
ALBUMIN SERPL-MCNC: 2.6 G/DL (ref 3.4–5)
ALP SERPL-CCNC: 211 U/L (ref 30–120)
ALT SERPL-CCNC: 99 U/L (ref 10–68)
ANION GAP SERPL CALC-SCNC: 11.2 MMOL/L (ref 8–16)
ANION GAP SERPL CALC-SCNC: 14.2 MMOL/L (ref 8–16)
BASOPHILS NFR BLD AUTO: 0.3 % (ref 0–2)
BILIRUB SERPL-MCNC: 0.35 MG/DL (ref 0.2–1.3)
BUN SERPL-MCNC: 6 MG/DL (ref 7–18)
BUN SERPL-MCNC: 8 MG/DL (ref 7–18)
CALCIUM SERPL-MCNC: 7.2 MG/DL (ref 8.5–10.1)
CALCIUM SERPL-MCNC: 7.8 MG/DL (ref 8.5–10.1)
CHLORIDE SERPL-SCNC: 105 MMOL/L (ref 98–107)
CHLORIDE SERPL-SCNC: 105 MMOL/L (ref 98–107)
CK MB SERPL-MCNC: 1.4 U/L (ref 0–3.6)
CK SERPL-CCNC: 49 UL (ref 21–215)
CO2 SERPL-SCNC: 20.9 MMOL/L (ref 21–32)
CO2 SERPL-SCNC: 23.4 MMOL/L (ref 21–32)
CREAT SERPL-MCNC: 0.5 MG/DL (ref 0.6–1.3)
CREAT SERPL-MCNC: 0.8 MG/DL (ref 0.6–1.3)
EOSINOPHIL NFR BLD: 11.6 % (ref 0–7)
ERYTHROCYTE [DISTWIDTH] IN BLOOD BY AUTOMATED COUNT: 12.4 % (ref 11.5–14.5)
GLOBULIN SER-MCNC: 3 G/L
GLUCOSE SERPL-MCNC: 224 MG/DL (ref 74–106)
GLUCOSE SERPL-MCNC: 304 MG/DL (ref 74–106)
HCT VFR BLD CALC: 37.1 % (ref 36–48)
HGB BLD-MCNC: 12.4 G/DL (ref 12–16)
IMM GRANULOCYTES NFR BLD: 0.3 % (ref 0–5)
LYMPHOCYTES NFR BLD AUTO: 24.5 % (ref 15–50)
MCH RBC QN AUTO: 30.6 PG (ref 26–34)
MCHC RBC AUTO-ENTMCNC: 33.4 G/DL (ref 31–37)
MCV RBC: 91.6 FL (ref 80–100)
MONOCYTES NFR BLD: 5.7 % (ref 2–11)
NEUTROPHILS NFR BLD AUTO: 57.6 % (ref 40–80)
OSMOLALITY SERPL CALC.SUM OF ELEC: 276 MOSM/KG (ref 275–300)
OSMOLALITY SERPL CALC.SUM OF ELEC: 278 MOSM/KG (ref 275–300)
PLATELET # BLD: 289 10X3/UL (ref 130–400)
PMV BLD AUTO: 9.8 FL (ref 7.4–10.4)
POTASSIUM SERPL-SCNC: 4.1 MMOL/L (ref 3.5–5.1)
POTASSIUM SERPL-SCNC: 4.6 MMOL/L (ref 3.5–5.1)
POTASSIUM SERPL-SCNC: 5.7 MMOL/L (ref 3.5–5.1)
PROT SERPL-MCNC: 5.6 G/DL (ref 6.4–8.2)
RBC # BLD AUTO: 4.05 10X6/UL (ref 4–5.4)
SODIUM SERPL-SCNC: 135 MMOL/L (ref 136–145)
SODIUM SERPL-SCNC: 136 MMOL/L (ref 136–145)
SODIUM SERPL-SCNC: 138 MMOL/L (ref 136–145)
TROPONIN I SERPL-MCNC: 0.03 NG/ML (ref 0–0.06)
WBC # BLD AUTO: 9.2 10X3/UL (ref 4.8–10.8)

## 2020-08-30 NOTE — NUR
REPORT RECEIVED. PT AWAKE SITTING UP IN BED.  BEDSIDE GETTING BLOOD.
IV TO LEFT HAND. PT HAS HISTORY OF LEFT BKA AND DM. STATES SHE IS HUNGRY AND
WANTS BREAKFAST. ALSO VERBALIZES WANTING TO LEAVE THE HOSPITAL. EXPLAINED THAT
WE HAVE TO WAIT ON THE DOCTORS TO GET HERE AND SAY IT'S OKAY, THAT WE WANT TO
MAKE SURE SHE IS STABLE 1ST. PT VERBALIZES UNDERSTANDING. WILL MONITOR.

## 2020-08-30 NOTE — NUR
ASSESSMENT COMPLETED PER FLOWSHEET. A/OX3, DENIES CP AT THIS TIME. PT
REQUESTED A FOOD BOX WITH SANDWICHES, CHIPS AND SODA. SANDWICHES BOX, SODA
GIVEN PER REQUEST. CALL LIGHT IN REACH. CPOC.

## 2020-08-30 NOTE — NUR
PT ASSISTED ONTO BEDPAN TO VOID, VOIDED 600CC DARK YELLOW URINE, PT
REPOSITIONED UP IN BED FOR COMFORT, REQUESTING BREAKFAST AND COFFEE, COFFEE
PROVIDED, EXPLAINED TO PATIENT BREAKFAST WOULD ARRIVE AT 0730, VERBALIZED
UNDERSTANDING.

## 2020-08-31 VITALS — SYSTOLIC BLOOD PRESSURE: 105 MMHG | DIASTOLIC BLOOD PRESSURE: 65 MMHG

## 2020-08-31 VITALS — SYSTOLIC BLOOD PRESSURE: 148 MMHG | DIASTOLIC BLOOD PRESSURE: 72 MMHG

## 2020-08-31 VITALS — SYSTOLIC BLOOD PRESSURE: 119 MMHG | DIASTOLIC BLOOD PRESSURE: 69 MMHG

## 2020-08-31 VITALS — DIASTOLIC BLOOD PRESSURE: 73 MMHG | SYSTOLIC BLOOD PRESSURE: 160 MMHG

## 2020-08-31 VITALS — SYSTOLIC BLOOD PRESSURE: 184 MMHG | DIASTOLIC BLOOD PRESSURE: 91 MMHG

## 2020-08-31 VITALS — DIASTOLIC BLOOD PRESSURE: 58 MMHG | SYSTOLIC BLOOD PRESSURE: 122 MMHG

## 2020-08-31 VITALS — SYSTOLIC BLOOD PRESSURE: 108 MMHG | DIASTOLIC BLOOD PRESSURE: 57 MMHG

## 2020-08-31 VITALS — SYSTOLIC BLOOD PRESSURE: 101 MMHG | DIASTOLIC BLOOD PRESSURE: 61 MMHG

## 2020-08-31 VITALS — SYSTOLIC BLOOD PRESSURE: 171 MMHG | DIASTOLIC BLOOD PRESSURE: 79 MMHG

## 2020-08-31 VITALS — SYSTOLIC BLOOD PRESSURE: 125 MMHG | DIASTOLIC BLOOD PRESSURE: 54 MMHG

## 2020-08-31 VITALS — SYSTOLIC BLOOD PRESSURE: 113 MMHG | DIASTOLIC BLOOD PRESSURE: 77 MMHG

## 2020-08-31 VITALS — SYSTOLIC BLOOD PRESSURE: 95 MMHG | DIASTOLIC BLOOD PRESSURE: 50 MMHG

## 2020-08-31 VITALS — SYSTOLIC BLOOD PRESSURE: 101 MMHG | DIASTOLIC BLOOD PRESSURE: 68 MMHG

## 2020-08-31 VITALS — DIASTOLIC BLOOD PRESSURE: 73 MMHG | SYSTOLIC BLOOD PRESSURE: 141 MMHG

## 2020-08-31 VITALS — SYSTOLIC BLOOD PRESSURE: 195 MMHG | DIASTOLIC BLOOD PRESSURE: 96 MMHG

## 2020-08-31 VITALS — SYSTOLIC BLOOD PRESSURE: 136 MMHG | DIASTOLIC BLOOD PRESSURE: 79 MMHG

## 2020-08-31 VITALS — DIASTOLIC BLOOD PRESSURE: 92 MMHG | SYSTOLIC BLOOD PRESSURE: 156 MMHG

## 2020-08-31 VITALS — DIASTOLIC BLOOD PRESSURE: 77 MMHG | SYSTOLIC BLOOD PRESSURE: 165 MMHG

## 2020-08-31 VITALS — SYSTOLIC BLOOD PRESSURE: 146 MMHG | DIASTOLIC BLOOD PRESSURE: 73 MMHG

## 2020-08-31 LAB
ALBUMIN SERPL-MCNC: 2.6 G/DL (ref 3.4–5)
ALP SERPL-CCNC: 184 U/L (ref 30–120)
ALT SERPL-CCNC: 59 U/L (ref 10–68)
ANION GAP SERPL CALC-SCNC: 10.7 MMOL/L (ref 8–16)
BASOPHILS NFR BLD AUTO: 0.4 % (ref 0–2)
BILIRUB SERPL-MCNC: 0.16 MG/DL (ref 0.2–1.3)
BUN SERPL-MCNC: 7 MG/DL (ref 7–18)
CALCIUM SERPL-MCNC: 7.8 MG/DL (ref 8.5–10.1)
CHLORIDE SERPL-SCNC: 109 MMOL/L (ref 98–107)
CHOLEST/HDLC SERPL: 5.8 RATIO (ref 2.3–4.1)
CO2 SERPL-SCNC: 23.1 MMOL/L (ref 21–32)
CREAT SERPL-MCNC: 0.7 MG/DL (ref 0.6–1.3)
EOSINOPHIL NFR BLD: 15.4 % (ref 0–7)
ERYTHROCYTE [DISTWIDTH] IN BLOOD BY AUTOMATED COUNT: 12.9 % (ref 11.5–14.5)
GLOBULIN SER-MCNC: 3 G/L
GLUCOSE SERPL-MCNC: 91 MG/DL (ref 74–106)
HCT VFR BLD CALC: 35.5 % (ref 36–48)
HDLC SERPL-MCNC: 32 MG/DL (ref 32–96)
HGB BLD-MCNC: 11.8 G/DL (ref 12–16)
IMM GRANULOCYTES NFR BLD: 0.3 % (ref 0–5)
LDL-HDL RATIO: 2.9 RATIO (ref 1.5–3.5)
LDLC SERPL-MCNC: 92 MG/DL (ref 0–100)
LYMPHOCYTES NFR BLD AUTO: 42.1 % (ref 15–50)
MCH RBC QN AUTO: 31.2 PG (ref 26–34)
MCHC RBC AUTO-ENTMCNC: 33.2 G/DL (ref 31–37)
MCV RBC: 93.9 FL (ref 80–100)
MONOCYTES NFR BLD: 7.3 % (ref 2–11)
NEUTROPHILS NFR BLD AUTO: 34.5 % (ref 40–80)
OSMOLALITY SERPL CALC.SUM OF ELEC: 275 MOSM/KG (ref 275–300)
PLATELET # BLD: 271 10X3/UL (ref 130–400)
PMV BLD AUTO: 10.3 FL (ref 7.4–10.4)
POTASSIUM SERPL-SCNC: 3.8 MMOL/L (ref 3.5–5.1)
PROT SERPL-MCNC: 5.6 G/DL (ref 6.4–8.2)
RBC # BLD AUTO: 3.78 10X6/UL (ref 4–5.4)
SODIUM SERPL-SCNC: 139 MMOL/L (ref 136–145)
TRIGL SERPL-MCNC: 312 MG/DL (ref 30–200)
WBC # BLD AUTO: 7.6 10X3/UL (ref 4.8–10.8)

## 2020-08-31 PROCEDURE — B2111ZZ FLUOROSCOPY OF MULTIPLE CORONARY ARTERIES USING LOW OSMOLAR CONTRAST: ICD-10-PCS | Performed by: INTERNAL MEDICINE

## 2020-08-31 PROCEDURE — 027035Z DILATION OF CORONARY ARTERY, ONE ARTERY WITH TWO DRUG-ELUTING INTRALUMINAL DEVICES, PERCUTANEOUS APPROACH: ICD-10-PCS | Performed by: INTERNAL MEDICINE

## 2020-08-31 PROCEDURE — B2151ZZ FLUOROSCOPY OF LEFT HEART USING LOW OSMOLAR CONTRAST: ICD-10-PCS | Performed by: INTERNAL MEDICINE

## 2020-08-31 PROCEDURE — 4A023N7 MEASUREMENT OF CARDIAC SAMPLING AND PRESSURE, LEFT HEART, PERCUTANEOUS APPROACH: ICD-10-PCS | Performed by: INTERNAL MEDICINE

## 2020-08-31 NOTE — MORECARE
CASE MANAGEMENT DISCHARGE SUMMARY
 
 
PATIENT: ALEXEY BAGLEY                        UNIT: [id number]
ACCOUNT#: [id number]                       ADM DATE: 20
AGE: 64     : [birth year]  SEX: F            [facility]    
AUTHOR: JASMYNE EPPERSON                             PHYSICIAN:                               
 
REFERRING PHYSICIAN: JAN HUDDLESTON MD               
DATE OF SERVICE: 20
Discharge Plan
 
 
Patient Name: ALEXEY BAGLEY
Facility: [facility]
Encounter #: [id number]
Medical Record #: [id number]
: 1955
Planned Disposition: 
Anticipated Discharge Date: 
 
Discharge Date: 
Expected LOS: 
Initial Reviewer: [id number]
Initial Review Date: 2020
Generated: 20   5:59 pm 
 DCPIA - Discharge Planning Initial Assessment
 
Updated by [email or web address]: Nakia Lobato on 20   4:56 pm
*  Is the patient Alert and Oriented?
No
*  PCP
[facility]
*  Pharmacy
[facility]
*  Preadmission Environment
Group Home
*  [facility]
*  ADLs
Partial Dependent
*  Partial ADLs (Assistance needed)
Medication Management
*  List name and contact numbers for known caregivers / representatives who 
currently or will assist patient after discharge:
LANA CORBIN [phone]
*  Verbal permission to speak to the caregivers and representatives has been 
obtained from the patient.
Yes
*  Community resources currently utilized
 
None
*  Additional services required to return to the preadmission environment?
No
*  Can the patient safely return to the preadmission environment?
Yes
*  Has this patient been hospitalized within the prior 30 days at any 
hospital?
No
 
 
 
 
 
 
Patient Name: ALEXEY BAGLEY
Encounter #: [id number]
Page 10148
 
 
 
 
 
Electronically Signed by JASMYNE EPPERSON on 20 at 1659
 
 
 
 
 
 
**All edits/amendments must be made on the electronic document**
 
DICTATION DATE: 20     : SENDY  20     
RPT#: [id number]                                DC DATE:        
                                               STATUS: ADM IN  
[facility]
 [place] [address]
***END OF REPORT***

## 2020-08-31 NOTE — MORECARE
CASE MANAGEMENT DISCHARGE SUMMARY
 
 
PATIENT: ALEXEY BAGLEY                        UNIT: [id number]
ACCOUNT#: [id number]                       ADM DATE: 20
AGE: 64     : 53  SEX: F            ROOM/BED: D.[facility]    
AUTHOR: ZEENAT,DOC                             PHYSICIAN:                               
 
REFERRING PHYSICIAN: JAN HUDDLESTON MD               
DATE OF SERVICE: 20
Discharge Plan
 
 
Patient Name: ALEXEY BAGLEY
Facility: [facility]:[place]
Encounter #: [id number]
Medical Record #: [id number]
: 1955
Planned Disposition: 
Anticipated Discharge Date: 
 
Discharge Date: 
Expected LOS: 
Initial Reviewer: [id number]
Initial Review Date: 2020
Generated: 20   6:06 pm 
Comments
 
DCP- Discharge Planning
 
Updated by [id number]: Nakia Lobato on 20   4:04 pm CT
Patient Name: ALEXEY BAGLEY                                     
Admission Status: ER   
Accout number: [id number]                              
Admission Date: 2020   
: 1955                                                        
Admission Diagnosis:   
Attending: JAN HUDDLESTON                                                
Current LOS:  2   
  
Anticipated DC Date:    
Planned Disposition:    
Primary Insurance: [employer] PPO   
  
  
Discharge Planning Comments: CM called and spoke with patient's  Jimmy.
  He stated that his wife lives in a private care home and her caregiver just 
tested positive for CoVID today. Plan was initially to discharge back to care 
home today but since her caregiver is ill.  Her spouse wants CM to try to get 
placement in NH facility for the time being. CK completed for [facility]. CM will continue to follow and assist as needed with discharge 
planning / needs. Patient will need to be quarantined for 14days to see if 
she develops any symptoms of COVID.  She will be placed in isolation while 
here in the hospital until placement can be arranged.   
  
  
  
  
  
  
: Nakia Lobato
 DCPIA - Discharge Planning Initial Assessment
 
Updated by [id number]: Nakia Lobato on 20   4:56 pm
*  Is the patient Alert and Oriented?
No
*  PCP
[facility]
*  Pharmacy
[facility]
*  Preadmission Environment
Group Home
*  [facility]
*  ADLs
Partial Dependent
*  Partial ADLs (Assistance needed)
Medication Management
*  List name and contact numbers for known caregivers / representatives who 
currently or will assist patient after discharge:
JIMMY EMERALD [phone]
*  Verbal permission to speak to the caregivers and representatives has been 
obtained from the patient.
Yes
*  Community resources currently utilized
None
*  Additional services required to return to the preadmission environment?
No
*  Can the patient safely return to the preadmission environment?
Yes
*  Has this patient been hospitalized within the prior 30 days at any 
hospital?
No
 
 
 
 
 
 
 
Last DP export: 20   3:59 p
Patient Name: ALEXEY BAGLEY
 
Encounter #: [id number]
Page [phone]
 
 
 
 
 
Electronically Signed by JASMYNE EPPERSON on 20 at 1707
 
 
 
 
 
 
**All edits/amendments must be made on the electronic document**
 
DICTATION DATE: 20     : SENDY  20     
RPT#: [id number]                                DC DATE:        
                                               STATUS: ADM IN  
[facility]
 [place] [address]
***END OF REPORT***

## 2020-08-31 NOTE — NUR
PT IS ANGRY AND WANTS BREAKFAST. EXPLAINED TO HER SHE CAN'T EAT BEFORE HER
PROCEDURE, THAT SHE SIGNED CONSENTS YESTERDAY AND WANTED TO HAVE THE HEART
CATH. STATES SHE STILL WANTS FOOD. WILL MONITOR.

## 2020-08-31 NOTE — NUR
PT BACK FROM CATH LAB. STENT TO LAD. WENT THROUGH RIGHT WRIST. TR BAND IN
PLACE. SMALL HEMATOMA NOTED.

## 2020-08-31 NOTE — NUR
PER INFECTION CONTROL, STEFANIE, PT IS TO BE QUARANTINED FOR 14 DAYS EVEN IF SHE
TESTS NEGATIVE FOR COVID, D/T CAREGIVER TESTING POSITIVE FOR COVID.

## 2020-08-31 NOTE — NUR
COMPLETED BEDBATH WITH CHG. BILAT GROIN CLIP DONE. SKIN CARE PROVIDED. GOWN
AND LINEN CHANGED. PT MOIZ WELL. REPOSITIONED SELF IN BED FOR COMFORT. CALL
LIGHT IN REACH. CPOC.

## 2020-08-31 NOTE — NUR
DR CRUZ PAGED AND RETURNED CALL. REPORTED PT FEELING ANXIOUS AND
REQUESTING ATIVAN. ORDER RECEIVED 2MG Q4H PRN FOR AGITATION AND ANXIETY.
REPORT GIVEN TO RECEIVING NURSE FOR 2101.

## 2020-09-01 VITALS — SYSTOLIC BLOOD PRESSURE: 112 MMHG | DIASTOLIC BLOOD PRESSURE: 74 MMHG

## 2020-09-01 VITALS — DIASTOLIC BLOOD PRESSURE: 58 MMHG | SYSTOLIC BLOOD PRESSURE: 103 MMHG

## 2020-09-01 VITALS — DIASTOLIC BLOOD PRESSURE: 86 MMHG | SYSTOLIC BLOOD PRESSURE: 158 MMHG

## 2020-09-01 VITALS — SYSTOLIC BLOOD PRESSURE: 110 MMHG | DIASTOLIC BLOOD PRESSURE: 72 MMHG

## 2020-09-01 NOTE — NUR
Recieved call from Isa Lobato RN that patients caregiver has tested positive
for COVID. Patient will need to remain in 14 day quarantine per [facility]
guidelines even with negative Covid results.

## 2020-09-01 NOTE — NUR
RECIEVE REPORT. ALERT AND ORIENTED X4. SITTING UP IN BED. EXPRESSES WANTING
PAIN MEDICATION. NO IV ACCESS. NOTIFY . PAIN MEDICATION CHANGED FROM
MORPHINE IV TO NORCO 5 PO. CONTINUE PLAN OF CARE AND SAFETY PRECAUTIONS.

## 2020-09-01 NOTE — NUR
PT IS EASILY AROUSED AT THIS TIME  I ATTEMPTED TO EDUCATE PT ON TO USE OF OUR
PPE AND HER USE OF CALL LIGHT SO SHE WILL ATTEMPT TO COMBINE NEEDS WHILE WE
ENTER ROOM BED LOW AND LOCKED CALL LIGHT IS WITH PT

## 2020-09-01 NOTE — MORECARE
CASE MANAGEMENT DISCHARGE SUMMARY
 
 
PATIENT: ALEXEY BAGLEY                        UNIT: [id number]
ACCOUNT#: [id number]                       ADM DATE: 20
AGE: 64     : 53  SEX: F            ROOM/BED: D.2101    
AUTHOR: ZEENAT,DOC                             PHYSICIAN:                               
 
REFERRING PHYSICIAN: JAN HUDDLESTON MD               
DATE OF SERVICE: 20
Discharge Plan
 
 
Patient Name: ALEXEY BAGLEY
Facility: [facility]:[place]
Encounter #: [id number]
Medical Record #: [id number]
: 1955
Planned Disposition: 
Anticipated Discharge Date: 
 
Discharge Date: 
Expected LOS: 
Initial Reviewer: [email or web address]
Initial Review Date: 2020
Generated: 20   3:55 pm 
Comments
 
DCP- Discharge Planning
 
Updated by [id number]: Nakia Lobato on 20   4:04 pm CT
Patient Name: ALEXEY BAGLEY                                     
Admission Status: ER   
Accout number: [id number]                              
Admission Date: 2020   
: 1955                                                        
Admission Diagnosis:   
Attending: JAN HUDDLESTON                                                
Current LOS:  2   
  
Anticipated DC Date:    
Planned Disposition:    
Primary Insurance: [employer] PPO   
  
  
Discharge Planning Comments: CM called and spoke with patient's  Jimmy.
  He stated that his wife lives in a private care home and her caregiver just 
tested positive for CoVID today. Plan was initially to discharge back to care 
home today but since her caregiver is ill.  Her spouse wants CM to try to get 
placement in NH facility for the time being. CK completed for [facility]. CM will continue to follow and assist as needed with discharge 
planning / needs. Patient will need to be quarantined for 14days to see if 
she develops any symptoms of COVID.  She will be placed in isolation while 
here in the hospital until placement can be arranged.   
  
  
  
  
  
  
: Nakia Lobato
 DCPIA - Discharge Planning Initial Assessment
 
Updated by [email or web address]: Nakia Lobato on 20   4:56 pm
*  Is the patient Alert and Oriented?
No
*  PCP
[facility]
*  Pharmacy
[facility]
*  Preadmission Environment
Group Home
*  [facility]ility [facility]
*  ADLs
Partial Dependent
*  Partial ADLs (Assistance needed)
Medication Management
*  List name and contact numbers for known caregivers / representatives who 
currently or will assist patient after discharge:
JIMMY EMERALD [phone]
*  Verbal permission to speak to the caregivers and representatives has been 
obtained from the patient.
Yes
*  Community resources currently utilized
None
*  Additional services required to return to the preadmission environment?
No
*  Can the patient safely return to the preadmission environment?
Yes
*  Has this patient been hospitalized within the prior 30 days at any 
hospital?
No
 
External Providers
External Provider: [facility]
 
Next Contact Date: 
Service Request Date: 
Service Type: 
Resolution: 
 
 
Reviewer: 
Comments: 
 
 
 
 
 
 
Last DP export: 20   4:07 p
Patient Name: AELXEY BAGLEY
Encounter #: [id number]
Page [id number]
 
 
 
 
 
Electronically Signed by JASMYNE EPPERSON on 20 at 1456
 
 
 
 
 
 
**All edits/amendments must be made on the electronic document**
 
DICTATION DATE: 20     : SENDY  20     
RPT#: [id number]                                DC DATE:        
                                               STATUS: ADM IN  
[facility]
 [place] [address]
***END OF REPORT***

## 2020-09-02 VITALS — DIASTOLIC BLOOD PRESSURE: 75 MMHG | SYSTOLIC BLOOD PRESSURE: 126 MMHG

## 2020-09-02 VITALS — DIASTOLIC BLOOD PRESSURE: 78 MMHG | SYSTOLIC BLOOD PRESSURE: 124 MMHG

## 2020-09-02 VITALS — SYSTOLIC BLOOD PRESSURE: 124 MMHG | DIASTOLIC BLOOD PRESSURE: 78 MMHG

## 2020-09-02 VITALS — SYSTOLIC BLOOD PRESSURE: 124 MMHG | DIASTOLIC BLOOD PRESSURE: 84 MMHG

## 2020-09-02 VITALS — SYSTOLIC BLOOD PRESSURE: 114 MMHG | DIASTOLIC BLOOD PRESSURE: 77 MMHG

## 2020-09-02 VITALS — DIASTOLIC BLOOD PRESSURE: 80 MMHG | SYSTOLIC BLOOD PRESSURE: 144 MMHG

## 2020-09-02 NOTE — MORECARE
CASE MANAGEMENT DISCHARGE SUMMARY
 
 
PATIENT: ALEXEY BAGLEY                        UNIT: [id number]
ACCOUNT#: [id number]                       ADM DATE: 20
AGE: 64     : 53  SEX: F            ROOM/BED: D.2101    
AUTHOR: ZEENATDOC                             PHYSICIAN:                               
 
REFERRING PHYSICIAN: JAN HUDDLESTON MD               
DATE OF SERVICE: 20
Discharge Plan
 
 
Patient Name: ALEXEY BAGLEY
Facility: [facility]:[place]
Encounter #: [id number]
Medical Record #: [id number]
: 1955
Planned Disposition: 
Anticipated Discharge Date: 
 
Discharge Date: 
Expected LOS: 
Initial Reviewer: [email or web address]
Initial Review Date: 2020
Generated: 20  10:00 am 
Comments
 
DCP- Discharge Planning
 
Updated by [email or web address]: Maria Luz Heart on 20   6:36 am CT
I had left the Bock form for patient's  to sign last night and it is 
not signed. I called patient's  and his voice mailbox is full and I am 
unable to leave a message. I called patient's room and she does not answer 
the phone.
DCP- Discharge Planning
 
Updated by [id number]: Nakia Lobato on 20   4:04 pm CT
Patient Name: ALEXEY BAGLEY                                     
Admission Status: ER   
Accout number: [id number]                              
Admission Date: 2020   
: 1955                                                        
Admission Diagnosis:   
Attending: JAN HUDDLESTON                                                
Current LOS:  2   
  
Anticipated DC Date:    
Planned Disposition:    
Primary Insurance: [employer] PPO   
 
  
  
Discharge Planning Comments: CM called and spoke with patient's  Jimmy.
  He stated that his wife lives in a private care home and her caregiver just 
tested positive for CoVID today. Plan was initially to discharge back to care 
home today but since her caregiver is ill.  Her spouse wants CM to try to get 
placement in NH facility for the time being. CK completed for [facility]. CM will continue to follow and assist as needed with discharge 
planning / needs. Patient will need to be quarantined for 14days to see if 
she develops any symptoms of COVID.  She will be placed in isolation while 
here in the hospital until placement can be arranged.   
  
  
  
  
  
  
: Nakia Lobato
 DCPIA - Discharge Planning Initial Assessment
 
Updated by [id number]: Nakia Lobato on 20   4:56 pm
*  Is the patient Alert and Oriented?
No
*  PCP
[facility]
*  Pharmacy
[facility]
*  Preadmission Environment
Group Home
*  [facility]
*  ADLs
Partial Dependent
*  Partial ADLs (Assistance needed)
Medication Management
*  List name and contact numbers for known caregivers / representatives who 
currently or will assist patient after discharge:
JIMMY CORBIN [phone]
*  Verbal permission to speak to the caregivers and representatives has been 
obtained from the patient.
Yes
*  Community resources currently utilized
None
*  Additional services required to return to the preadmission environment?
No
*  Can the patient safely return to the preadmission environment?
Yes
*  Has this patient been hospitalized within the prior 30 days at any 
hospital?
No
 
External Providers
 
External Provider: [facility]
 
Next Contact Date: 
Service Request Date: 
Service Type: 
Resolution: 
 
Reviewer: 
Comments: 
 
 
 
 
 
 
Last DP export: 20   6:41 am
Patient Name: ALEXEY BAGLEY
Encounter #: [id number]
Page [phone]
 
 
 
 
 
Electronically Signed by JASMYNE EPPERSON on 20 at 0900
 
 
 
 
 
 
**All edits/amendments must be made on the electronic document**
 
DICTATION DATE: 20 09     : SENDY  20 09     
RPT#: [id number]                                DC DATE:        
                                               STATUS: ADM IN  
[facility]
 [place] [address]
***END OF REPORT***

## 2020-09-02 NOTE — NUR
Nutrition Follow-up:
Pt in droplet isolation; quarantine 2/2 caregiver testing + for covid. Nursing
reports pt eating well; ate 100% of breakfast this AM.
Diet: Diabetic
PO intake: 100% x 4 meals
Wt: 103.6# (8/31)
Labs reviewed
Meds noted: Humalog, Lantus, electrolyte protocol
-Monitor wt; noted daily wts ordered.
-RD following.

## 2020-09-02 NOTE — NUR
REPORT RECIEVED AND ROUNDING COMPLETE. PATIENT LAYING IN BED IN LOW FOWLERS
POSITION. PATIENT STATES SHE COULD HAVE HAD A BETTER DAY. STATES SHE HAS NO
NEEDS AT THIS TIME. NO DISTRESS NOTED. NO PIV. LEFT BKA. UPDATED PATIENT'S
WHITEBOARD AND GAVE PATIENT THE NURSING DESK NUMBER TO CALL WITH NEEDS SINCE
PATICAITLIN IS IN AN ISO ROOM FOR COVID. CALL LIGHT AND PHONE WITHIN REACH AND
BED IN LOWEST LOCKED POSITION.

## 2020-09-02 NOTE — MORECARE
CASE MANAGEMENT DISCHARGE SUMMARY
 
 
PATIENT: ALEXEY BAGLEY                        UNIT: [id number]
ACCOUNT#: [id number]                       ADM DATE: 20
AGE: 64     : 53  SEX: F            ROOM/BED: D.2101    
AUTHOR: ZEENATDOC                             PHYSICIAN:                               
 
REFERRING PHYSICIAN: JAN HUDDLESTON MD               
DATE OF SERVICE: 20
Discharge Plan
 
 
Patient Name: ALEXEY BAGLEY
Facility: [facility]:[place]
Encounter #: [id number]
Medical Record #: [id number]
: 1955
Planned Disposition: 
Anticipated Discharge Date: 
 
Discharge Date: 
Expected LOS: 
Initial Reviewer: [email or web address]
Initial Review Date: 2020
Generated: 20   8:41 am 
Comments
 
DCP- Discharge Planning
 
Updated by [id number]: Maria Luz Loujensen on 20   6:36 am CT
I had left the Bock form for patient's  to sign last night and it is 
not signed. I called patient's  and his voice mailbox is full and I am 
unable to leave a message. I called patient's room and she does not answer 
the phone.
DCP- Discharge Planning
 
Updated by [id number]: Nakia Lobato on 20   4:04 pm CT
Patient Name: ALEXEY BAGLEY                                     
Admission Status: ER   
Accout number: [id number]                              
Admission Date: 2020   
: 1955                                                        
Admission Diagnosis:   
Attending: JAN HUDDLESTON                                                
Current LOS:  2   
  
Anticipated DC Date:    
Planned Disposition:    
Primary Insurance: [employer] PPO   
 
  
  
Discharge Planning Comments: CM called and spoke with patient's  Jimmy.
  He stated that his wife lives in a private care home and her caregiver just 
tested positive for CoVID today. Plan was initially to discharge back to care 
home today but since her caregiver is ill.  Her spouse wants CM to try to get 
placement in NH facility for the time being. CK completed for [facility]. CM will continue to follow and assist as needed with discharge 
planning / needs. Patient will need to be quarantined for 14days to see if 
she develops any symptoms of COVID.  She will be placed in isolation while 
here in the hospital until placement can be arranged.   
  
  
  
  
  
  
: Nakia Lobato
 DCPIA - Discharge Planning Initial Assessment
 
Updated by [email or web address]: Nakia Lobato on 20   4:56 pm
*  Is the patient Alert and Oriented?
No
*  PCP
[facility]
*  Pharmacy
[facility]
*  Preadmission Environment
Group Home
*  [facility]ility [facility]
*  ADLs
Partial Dependent
*  Partial ADLs (Assistance needed)
Medication Management
*  List name and contact numbers for known caregivers / representatives who 
currently or will assist patient after discharge:
JIMMY CORBIN [phone]
*  Verbal permission to speak to the caregivers and representatives has been 
obtained from the patient.
Yes
*  Community resources currently utilized
None
*  Additional services required to return to the preadmission environment?
No
*  Can the patient safely return to the preadmission environment?
Yes
*  Has this patient been hospitalized within the prior 30 days at any 
hospital?
No
 
 
 
 
 
 
 
 
Last DP export: 20   1:56 pm
Patient Name: ALEXEY BAGLEY
Encounter #: [id number]
Page [phone]
 
 
 
 
 
Electronically Signed by JASMYNE EPPERSON on 20 at 0741
 
 
 
 
 
 
**All edits/amendments must be made on the electronic document**
 
DICTATION DATE: 20     : SENDY  20     
RPT#: [id number]                                DC DATE:        
                                               STATUS: ADM IN  
[facility]
[address] [place] [address]
***END OF REPORT***

## 2020-09-02 NOTE — OP
PATIENT NAME:  ALEXEY BAGLEY                              MEDICAL RECORD: [id number]
:53                                             LOCATION:D.M2     D.2101
                                                         ADMISSION DATE:20
SURGEON:  KADEN HERMOSILLO MD          
 
 
DATE OF OPERATION:  2020
 
PROCEDURE:  Left heart catheterization, selective coronary angiography, right
radial approach.
 
CATHETERS:  A 5-German sheath, 5/4 left and right Elmo, 5/4 pig.
 
The procedure was well tolerated.  The patient was returned to [place].  Sheath was
removed.  TR band was placed.
 
FINDINGS:  Left ventriculography in 30-degree [place] view:  Normal wall motion,
normal systolic function.
 
CORONARY ANATOMY:
LEFT MAIN:  Left main is free of disease.
LAD:  Has 2 sequential 80% stenosis, 1 at the mid portion and 1 more proximally,
both fairly discrete.
CIRCUMFLEX:  Circumflex has an OM that is previously stented.  This is totally
occluded; however, fills via left to left collaterals.  Circumflex is free of
disease.
RIGHT CORONARY ARTERY:  The previous stenting is widely patent.
 
IMPRESSION AND PLAN:  Intervention to LAD momentarily.
 
DESCRIPTION OF PROCEDURE:  Using indwelling sheath, XB LAD guide catheter
provided good guide catheter support followed by minamo wire. This was placed
across both lesions of the LAD down the portion of vessel.  Distally, a 3.0 [place]
drug-eluting stent was deployed.  Proximally a 3.0 x 12 mm [place] drug-eluting
stent was deployed, both up to 14 atmospheres for 45 seconds.  Final angiography
showed excellent resolution of sequential stenosis, no significant residual. 
JUDE flow was 3 throughout the procedure. Sheath was closed with a TR band.
 
TRANSINT:[id number] Voice Confirmation ID: [id number] DOCUMENT ID: [id number]
                                           
                                           KADEN HERMOSILLO MD          
 
 
 
Electronically Signed by KADEN HERMOSILLO on 20 at 0758
 
 
 
 
 
CC:                                                             [id number]
DICTATION DATE: 20 1108     :     20 2254      ADM IN  
                                                                              
[place] [address]

## 2020-09-02 NOTE — MORECARE
CASE MANAGEMENT DISCHARGE SUMMARY
 
 
PATIENT: ALEXEY BAGLEY                        UNIT: [id number]
ACCOUNT#: [id number]                       ADM DATE: 20
AGE: 64     : 53  SEX: F            ROOM/BED: D.2101    
AUTHOR: ZEENATDOC                             PHYSICIAN:                               
 
REFERRING PHYSICIAN: JAN HUDDLESTON MD               
DATE OF SERVICE: 20
Discharge Plan
 
 
Patient Name: ALEXEY BAGLEY
Facility: [facility]:[place]
Encounter #: [id number]
Medical Record #: [id number]
: 1955
Planned Disposition: 
Anticipated Discharge Date: 
 
Discharge Date: 
Expected LOS: 
Initial Reviewer: [id number]
Initial Review Date: 2020
Generated: 20   2:44 pm 
Comments
 
DCP- Discharge Planning
 
Updated by [id number]: Maria Luz Heart on 20  12:38 pm CT
I called [facility] to check on status of referral, message left to call 
me back. I did speak with patient's , he states his home address is 
[address] [place] [address]. His wife was at a care home at [address] [place] [address]. 
He states that he is unable to care for his wife and the care home is not 
taking good care of her. He gives me his back up facilities at [facility] or [facility]. I will continue to follow and assist with 
discharge planning/needs. Awaiting insurance auth.
DCP- Discharge Planning
 
Updated by [id number]: Maria Luz Heart on 20   6:36 am CT
I had left the Bock form for patient's  to sign last night and it is 
not signed. I called patient's  and his voice mailbox is full and I am 
unable to leave a message. I called patient's room and she does not answer 
the phone.
DCP- Discharge Planning
 
Updated by [id number]: Nakia Lobato on 20   4:04 pm CT
Patient Name: ALEXEY BAGLEY                                     
Admission Status: ER   
 
Accout number: [id number]                              
Admission Date: 2020   
: 1955                                                        
Admission Diagnosis:   
Attending: JAN HUDDLESTON                                                
Current LOS:  2   
  
Anticipated DC Date:    
Planned Disposition:    
Primary Insurance: [employer] PPO   
  
  
Discharge Planning Comments:  called and spoke with patient's  Jimmy.
  He stated that his wife lives in a private care home and her caregiver just 
tested positive for CoVID today. Plan was initially to discharge back to care 
home today but since her caregiver is ill.  Her spouse wants CM to try to get 
placement in NH facility for the time being. CK completed for [facility]. CM will continue to follow and assist as needed with discharge 
planning / needs. Patient will need to be quarantined for 14days to see if 
she develops any symptoms of COVID.  She will be placed in isolation while 
here in the hospital until placement can be arranged.   
  
  
  
  
  
  
: Nakia Lobato
 DCPIA - Discharge Planning Initial Assessment
 
Updated by [id number]: Nakia Lobato on 20   4:56 pm
*  Is the patient Alert and Oriented?
No
*  PCP
[facility]
*  Pharmacy
[facility]
*  Preadmission Environment
Group Home
*  [facility]ility [facility]
*  ADLs
Partial Dependent
*  Partial ADLs (Assistance needed)
Medication Management
*  List name and contact numbers for known caregivers / representatives who 
currently or will assist patient after discharge:
JIMMY CORBIN [phone]
*  Verbal permission to speak to the caregivers and representatives has been 
obtained from the patient.
Yes
*  Community resources currently utilized
 
None
*  Additional services required to return to the preadmission environment?
No
*  Can the patient safely return to the preadmission environment?
Yes
*  Has this patient been hospitalized within the prior 30 days at any 
hospital?
No
 
 
 
 
 
 
 
Last DP export: 20   8:01 am
Patient Name: ALEXEY BAGLEY
Encounter #: [id number]
Page [phone]
 
 
 
 
 
Electronically Signed by JASMYNE EPPERSON on 20 at 1344
 
 
 
 
 
 
**All edits/amendments must be made on the electronic document**
 
DICTATION DATE: 20 1344     : SENDY  20 1344     
RPT#: [id number]                                DC DATE:        
                                               STATUS: ADM IN  
[facility]
[address] [place] [address]
***END OF REPORT***

## 2020-09-02 NOTE — CN
PATIENT NAME:ALEXEY BAGLEY                                  MEDICAL RECORD: [id number]
: 53                                              LOCATION:[facility] D.2101
ADMIT DATE: 20                                       ACCOUNT: [id number]
CONSULTING PHYSICIAN:    KADEN HERMOSILLO MD          
                                               
REFERRING PHYSICIAN:     JAN HUDDLESTON MD               
 
 
DATE OF CONSULTATION:  2020
 
HISTORY OF PRESENT ILLNESS:  A 64-year-old with historically history of MI times
2, has a history of diabetes mellitus, hypertension, dyslipidemia, somewhat of a
poor historian, reports chest pain ongoing for the past 24 hours, described as
pressure, tightness, radiating to the throat, has had intervention in the past
by report.  We are asked to see her concerning her cardiovascular status.
 
PAST MEDICAL HISTORY:  Includes;
1.  History of peripheral vascular disease.
2.  Hypertension.
3.  Hyperlipidemia.
4.  Diabetes mellitus.
5.  Coronary artery disease as described above.
 
ALLERGIES:  CODEINE.
 
MEDICATIONS:  Include Seroquel 100 mg every day and 300 mg every night, Effexor
75 every day, Voltaren 75 b.i.d., insulin per scale.
 
SOCIAL HISTORY:  Smokes about a pack a day, nondrinker.  No set of exercise
program.  The patient reports able to take care of all her ADLs.
 
REVIEW OF SYSTEMS:  The patient reports easy bruising but reports no swollen
glands. The patient reports no fever, no night sweats, no significant weight
gain, no significant weight loss.  No significant exercise tolerance.  The
patient reports no dry eyes, no irritation, no vision change.  Patient reports
no difficulty hearing and no ear pain.  Patient reports no frequent nose bleeds
or nose and sinus problems.  Patient reports on arm pain on exertion.   No
shortness of breath while lying down.  No history of heart murmur.  Patient
reports no cough, no wheezing or coughing up blood.  Patient reports no
abdominal pain, no vomiting.  Normal appetite.  No diarrhea and not vomiting
blood.  No nausea and no constipation.  Patient reports no incontinence.  No
difficulty urinating.  No hematuria.  No increased frequency.  Patient reports
no muscle aches.  No weakness, no arthralgias, no back pain.  No swelling of the
extremities.  Patient reports no abnormal mole, no jaundice, no rashes.  Reports
no loss of consciousness.  No weakness and no numbness.  No seizures, dizziness,
or headaches.  The patient reports no depression, no sleep disturbance, feeling
safe in a relationship and no alcohol abuse.  Patient reports on fatigue. 
Reports no runny nose or sinus pressure.  No itching, no hives, and no frequent
sneezing.
 
PHYSICAL EXAMINATION:
GENERAL:  Well-developed, well-nourished, appears stated age.
VITAL SIGNS:  Blood pressure 114/57, pulse 81 and regular.
HEENT:  Normocephalic, atraumatic.
NECK:  No bruits noted.
HEART:  Regular.  II/VI systolic ejection murmur.
LUNGS:  Fair air excursion.
ABDOMEN:  Soft, nontender.
 
 
 
CONSULT REPORT                                 [id number]    ALEXEY BAGLEY                
 
 
EXTREMITIES:  Pulses 1+ on the right.
 
IMPRESSION:  Acute coronary syndrome, known history of coronary artery disease.
 
PLAN:  For angiography, intervention based on above.
 
TRANSINT:[id number] Voice Confirmation ID: [id number] DOCUMENT ID: [id number]
                                           
                                           KADEN HERMOSILLO MD          
 
 
 
Electronically Signed by KADEN HERMOSILLO on 20 at 0758
 
 
 
 
 
 
 
 
 
 
 
 
 
 
 
 
 
 
 
 
 
 
 
 
 
 
 
 
 
 
 
 
 
 
CC:                                                             [id number]
DICTATION DATE: 20 1005     :     20 1706      ADM IN  
                                                                              
[place] [id number] [place] [id number]

## 2020-09-03 VITALS — SYSTOLIC BLOOD PRESSURE: 119 MMHG | DIASTOLIC BLOOD PRESSURE: 81 MMHG

## 2020-09-03 VITALS — DIASTOLIC BLOOD PRESSURE: 87 MMHG | SYSTOLIC BLOOD PRESSURE: 182 MMHG

## 2020-09-03 VITALS — DIASTOLIC BLOOD PRESSURE: 89 MMHG | SYSTOLIC BLOOD PRESSURE: 143 MMHG

## 2020-09-03 VITALS — DIASTOLIC BLOOD PRESSURE: 81 MMHG | SYSTOLIC BLOOD PRESSURE: 122 MMHG

## 2020-09-03 VITALS — DIASTOLIC BLOOD PRESSURE: 77 MMHG | SYSTOLIC BLOOD PRESSURE: 152 MMHG

## 2020-09-03 NOTE — NUR
RECEIVE SHIFT REPORT. RESTING IN BED WITH TV ON. STATES SHE WANTS COFFEE.
DENIES ANY OTHER NEEDS AT THIS TIME. WILL CONTINUE PLAN OF CARE AND SAFETY
PRECAUTIONS.

## 2020-09-03 NOTE — MORECARE
CASE MANAGEMENT DISCHARGE SUMMARY
 
 
PATIENT: ALEXEY BAGLEY                        UNIT: [id number]
ACCOUNT#: [id number]                       ADM DATE: 20
AGE: 64     : 53  SEX: F            ROOM/BED: D.2101    
AUTHOR: ZEENATDOC                             PHYSICIAN:                               
 
REFERRING PHYSICIAN: JAN HUDDLESTON MD               
DATE OF SERVICE: 20
Discharge Plan
 
 
Patient Name: ALEXEY BAGLEY
Facility: [facility]:[place]
Encounter #: [id number]
Medical Record #: [id number]
: 1955
Planned Disposition: 
Anticipated Discharge Date: 
 
Discharge Date: 
Expected LOS: 
Initial Reviewer: [id number]
Initial Review Date: 2020
Generated: 9/3/20  10:21 am 
Comments
 
DCP- Discharge Planning
 
Updated by [email or web address]: Maria Luzselina Heart on 9/3/20   8:17 am CT
I spoke with Victor Manuel with [facility] and refaxed paper work per his 
request.
DCP- Discharge Planning
 
Updated by [id number]: Maria Luz Heart on 20  12:38 pm CT
I called [facility] to check on status of referral, message left to call 
me back. I did speak with patient's , he states his home address is 
[address] [place] [address]. His wife was at a care home at [address] [place] [address]. 
He states that he is unable to care for his wife and the care home is not 
taking good care of her. He gives me his back up facilities at [facility] or [place]. I will continue to follow and assist with 
discharge planning/needs. Awaiting insurance auth.
DCP- Discharge Planning
 
Updated by [id number]: Maria Luz Una on 20   6:36 am CT
I had left the [employer] form for patient's  to sign last night and it is 
not signed. I called patient's  and his voice mailbox is full and I am 
unable to leave a message. I called patient's room and she does not answer 
the phone.
 
DCP- Discharge Planning
 
Updated by [email or web address]: Nakia Lobato on 20   4:04 pm CT
Patient Name: ALEXEY BAGLEY                                     
Admission Status: ER   
Accout number: [id number]                              
Admission Date: 2020   
: 1955                                                        
Admission Diagnosis:   
Attending: JAN HUDDLESTON                                                
Current LOS:  2   
  
Anticipated DC Date:    
Planned Disposition:    
Primary Insurance: [employer] PPO   
  
  
Discharge Planning Comments: CM called and spoke with patient's  Jimmy.
  He stated that his wife lives in a private care home and her caregiver just 
tested positive for CoVID today. Plan was initially to discharge back to care 
home today but since her caregiver is ill.  Her spouse wants CM to try to get 
placement in NH facility for the time being. CK completed for [facility]. CM will continue to follow and assist as needed with discharge 
planning / needs. Patient will need to be quarantined for 14days to see if 
she develops any symptoms of COVID.  She will be placed in isolation while 
here in the hospital until placement can be arranged.   
  
  
  
  
  
  
: Nakia Lobato
 DCPIA - Discharge Planning Initial Assessment
 
Updated by [email or web address]: Nakia Lobato on 20   4:56 pm
*  Is the patient Alert and Oriented?
No
*  PCP
[facility]
*  Pharmacy
[facility]
*  Preadmission Environment
Group Home
*  [facility]
*  ADLs
Partial Dependent
*  Partial ADLs (Assistance needed)
Medication Management
*  List name and contact numbers for known caregivers / representatives who 
currently or will assist patient after discharge:
 
JIMMY CORBIN [phone]
*  Verbal permission to speak to the caregivers and representatives has been 
obtained from the patient.
Yes
*  Community resources currently utilized
None
*  Additional services required to return to the preadmission environment?
No
*  Can the patient safely return to the preadmission environment?
Yes
*  Has this patient been hospitalized within the prior 30 days at any 
hospital?
No
 
 
 
 
 
 
 
Last DP export: 20  12:44 pm
Patient Name: ALEXEY BAGLEY
Encounter #: [id number]
Page [id number]
 
 
 
 
 
Electronically Signed by JASMYNE EPPERSON on 20 at 0921
 
 
 
 
 
 
**All edits/amendments must be made on the electronic document**
 
DICTATION DATE: 20     : SENDY  20     
RPT#: [id number]                                DC DATE:        
                                               STATUS: ADM IN  
[facility]
 [place] [address]
***END OF REPORT***

## 2020-09-03 NOTE — NUR
AT REST WIOTH EYES CLOSED I DID NOT ENTER ROOM AT THIS TIME RESP EVEN AND
APPEAR UNLABORED. CALL LIGHT IS IN REACH

## 2020-09-04 VITALS — SYSTOLIC BLOOD PRESSURE: 117 MMHG | DIASTOLIC BLOOD PRESSURE: 80 MMHG

## 2020-09-04 VITALS — DIASTOLIC BLOOD PRESSURE: 68 MMHG | SYSTOLIC BLOOD PRESSURE: 95 MMHG

## 2020-09-04 VITALS — SYSTOLIC BLOOD PRESSURE: 151 MMHG | DIASTOLIC BLOOD PRESSURE: 79 MMHG

## 2020-09-04 VITALS — SYSTOLIC BLOOD PRESSURE: 117 MMHG | DIASTOLIC BLOOD PRESSURE: 70 MMHG

## 2020-09-04 VITALS — DIASTOLIC BLOOD PRESSURE: 70 MMHG | SYSTOLIC BLOOD PRESSURE: 108 MMHG

## 2020-09-04 NOTE — NUR
Nutrition Follow-up:
Pt remains in droplet isolation. Ate ~75% of breakfast this AM. Awaiting
placement.
Diet: Diabetic
PO intake: %
Wt: 103.6# (8/31)
Labs noted: Glu 187 (9/3)
Meds noted: Humalog, Lantus, electrolyte protocol
-RD following.

## 2020-09-05 VITALS — SYSTOLIC BLOOD PRESSURE: 109 MMHG | DIASTOLIC BLOOD PRESSURE: 69 MMHG

## 2020-09-05 VITALS — SYSTOLIC BLOOD PRESSURE: 143 MMHG | DIASTOLIC BLOOD PRESSURE: 63 MMHG

## 2020-09-05 VITALS — DIASTOLIC BLOOD PRESSURE: 60 MMHG | SYSTOLIC BLOOD PRESSURE: 110 MMHG

## 2020-09-05 VITALS — DIASTOLIC BLOOD PRESSURE: 84 MMHG | SYSTOLIC BLOOD PRESSURE: 168 MMHG

## 2020-09-05 LAB
ANION GAP SERPL CALC-SCNC: 14.4 MMOL/L (ref 8–16)
BASOPHILS NFR BLD AUTO: 0.4 % (ref 0–2)
BUN SERPL-MCNC: 9 MG/DL (ref 7–18)
CALCIUM SERPL-MCNC: 8.7 MG/DL (ref 8.5–10.1)
CHLORIDE SERPL-SCNC: 105 MMOL/L (ref 98–107)
CO2 SERPL-SCNC: 23.4 MMOL/L (ref 21–32)
CREAT SERPL-MCNC: 0.7 MG/DL (ref 0.6–1.3)
EOSINOPHIL NFR BLD: 4.1 % (ref 0–7)
ERYTHROCYTE [DISTWIDTH] IN BLOOD BY AUTOMATED COUNT: 12.7 % (ref 11.5–14.5)
GLUCOSE SERPL-MCNC: 193 MG/DL (ref 74–106)
HCT VFR BLD CALC: 35.2 % (ref 36–48)
HGB BLD-MCNC: 11.5 G/DL (ref 12–16)
IMM GRANULOCYTES NFR BLD: 0.4 % (ref 0–5)
LYMPHOCYTES NFR BLD AUTO: 28.5 % (ref 15–50)
MCH RBC QN AUTO: 31 PG (ref 26–34)
MCHC RBC AUTO-ENTMCNC: 32.7 G/DL (ref 31–37)
MCV RBC: 94.9 FL (ref 80–100)
MONOCYTES NFR BLD: 10.1 % (ref 2–11)
NEUTROPHILS NFR BLD AUTO: 56.5 % (ref 40–80)
OSMOLALITY SERPL CALC.SUM OF ELEC: 281 MOSM/KG (ref 275–300)
PLATELET # BLD: 327 10X3/UL (ref 130–400)
PMV BLD AUTO: 10 FL (ref 7.4–10.4)
POTASSIUM SERPL-SCNC: 3.8 MMOL/L (ref 3.5–5.1)
RBC # BLD AUTO: 3.71 10X6/UL (ref 4–5.4)
SODIUM SERPL-SCNC: 139 MMOL/L (ref 136–145)
WBC # BLD AUTO: 7.9 10X3/UL (ref 4.8–10.8)

## 2020-09-05 NOTE — NUR
PT AWAKE AND ORIETNED, LYINGIN BED. REQUESTING HOT ARABELLA. WILL PROVIDE WHEN I
LOCATE SAID TOWELS. NO COMPLAINTS OR CONCERNS AT THIS TIME. TOOK MEDIACTIONS
WITHOT COMPLIATIONS. CL IN REACH, SRX2.

## 2020-09-06 VITALS — DIASTOLIC BLOOD PRESSURE: 78 MMHG | SYSTOLIC BLOOD PRESSURE: 125 MMHG

## 2020-09-06 VITALS — DIASTOLIC BLOOD PRESSURE: 81 MMHG | SYSTOLIC BLOOD PRESSURE: 139 MMHG

## 2020-09-06 VITALS — SYSTOLIC BLOOD PRESSURE: 125 MMHG | DIASTOLIC BLOOD PRESSURE: 78 MMHG

## 2020-09-06 VITALS — SYSTOLIC BLOOD PRESSURE: 97 MMHG | DIASTOLIC BLOOD PRESSURE: 54 MMHG

## 2020-09-06 VITALS — DIASTOLIC BLOOD PRESSURE: 72 MMHG | SYSTOLIC BLOOD PRESSURE: 137 MMHG

## 2020-09-06 VITALS — SYSTOLIC BLOOD PRESSURE: 119 MMHG | DIASTOLIC BLOOD PRESSURE: 77 MMHG

## 2020-09-06 VITALS — DIASTOLIC BLOOD PRESSURE: 76 MMHG | SYSTOLIC BLOOD PRESSURE: 133 MMHG

## 2020-09-06 NOTE — NUR
ALERT AND ORIENTED X4. COVID TEST COMPLETE AND TAKEN TO LAB. ATIVAN
ADMINISTERED AS ORDERED FOR ANXIETY PER PATIENT REQUEST. DENIES ANY OTHER
NEEDS AT THIS TIME. CONTINUE PLAN OF CARE AND SAFETY PRECAUTIONS.

## 2020-09-06 NOTE — NUR
REQUESTED ATIVAN. NO OBVIOUS S/S OF ANXIETY. UP IN BED DRINKING COFFEE. DENIES
ANY OTHER NEEDS AT THIS TIME.

## 2020-09-06 NOTE — NUR
RECIEVE REPORT. ALERT AND ORIENTED X4. DEMANDING WARM BLANKET AND FOOD.
EXPLAIN WITH BREAKFAST ARRIVES FOOD WITH BE DELIVERED. NO SIGNS OF DISTRESS.
CONTINUE PLAN OF CARE AND SAFETY PRECAUTIONS.

## 2020-09-07 VITALS — DIASTOLIC BLOOD PRESSURE: 100 MMHG | SYSTOLIC BLOOD PRESSURE: 180 MMHG

## 2020-09-07 VITALS — DIASTOLIC BLOOD PRESSURE: 93 MMHG | SYSTOLIC BLOOD PRESSURE: 172 MMHG

## 2020-09-07 VITALS — SYSTOLIC BLOOD PRESSURE: 140 MMHG | DIASTOLIC BLOOD PRESSURE: 99 MMHG

## 2020-09-07 NOTE — NUR
RECIEVE REPORT. ALERT AND ORIENTED X4. SITTING UP IN BED. REQUESTING ATIVAN.
EXPLAIN PAIN MEDICATION JUST ADMINISTERED, WAIT 2 HOURS TO PREVENT SUPPRESSING
RESPIRATIONS.

## 2020-09-07 NOTE — NUR
LETHARGIC. AROUSES TO STIMULI. REQUEST FOR PAIN MEDICATION DENIED DUE TO
LETHARGY. SITS UP IN BED BEGINS EATING DINER. DENIES ANY OTHER NEEDS. CONTINUE
PLAN OF CARE AND SAFETY PRECAUTIONS.

## 2020-09-07 NOTE — NUR
C/O HAVING CHEST PAIN. V/S'S TAKEN AND WNL. STATED WHEN SHE GETS ANXIOUS HER
CHEST HURTS SOMETIMES. ATIVAN 2 MG GIVEN PER ORDER. 20 MIN LATER CALLED AND
WANTED DILAUDID. EXPLAINED CAN NOT GIVE THEM TO CLOSE TOGETHER. LOOKED AT [facility] NO DILAUDID ORDER FOUND.

## 2020-09-07 NOTE — NUR
REPORT RECEIVED, WILL CONT POC. PT A&O, RESTING IN BED. NO S/S OF DISTRESSED
OBSERVED. RR EVEN AND UNLABORED ON RA. PT REQUESTED WATER, BROUGHT TO BEDSIDE.
PT DENIES NEEDS AT THIS TIME. CALL LIGHT IN REACH, BED LOCKED AND LOWERED.
ASSESSMENT COMPLETED AT THIS TIME. WILL CONT TO MONITOR.

## 2020-09-07 NOTE — NUR
CNA REPORTED TO ME THAT PT WAS COMPLAINING OF CHEST PAIN. IMMEDIATELY
ADDRESSED PT, PT REPORT SHARP PAIN TO THE RIGHT OF THE STERNUM. PAGED MD BRYCE WHO GAVE ORDERS FOR CATAPRESS NOW AND Q4H AS WELL AS NITRO PRN, EKG, AND
CARDIAC ENZYMES. 30 MINS AFTER ADMINISTERING CATAPRESS, PT VOMITED. PT DENIED
ANY MORE NAUSEA BUT SAID CHEST PAIN WAS 7/10. WILL RECHECK BP IN 1.5 HRS.
WILL CONT MONITOR.

## 2020-09-08 VITALS — SYSTOLIC BLOOD PRESSURE: 108 MMHG | DIASTOLIC BLOOD PRESSURE: 79 MMHG

## 2020-09-08 VITALS — DIASTOLIC BLOOD PRESSURE: 70 MMHG | SYSTOLIC BLOOD PRESSURE: 107 MMHG

## 2020-09-08 VITALS — SYSTOLIC BLOOD PRESSURE: 101 MMHG | DIASTOLIC BLOOD PRESSURE: 69 MMHG

## 2020-09-08 VITALS — DIASTOLIC BLOOD PRESSURE: 95 MMHG | SYSTOLIC BLOOD PRESSURE: 143 MMHG

## 2020-09-08 VITALS — DIASTOLIC BLOOD PRESSURE: 80 MMHG | SYSTOLIC BLOOD PRESSURE: 121 MMHG

## 2020-09-08 VITALS — DIASTOLIC BLOOD PRESSURE: 81 MMHG | SYSTOLIC BLOOD PRESSURE: 121 MMHG

## 2020-09-08 VITALS — SYSTOLIC BLOOD PRESSURE: 156 MMHG | DIASTOLIC BLOOD PRESSURE: 100 MMHG

## 2020-09-08 VITALS — SYSTOLIC BLOOD PRESSURE: 103 MMHG | DIASTOLIC BLOOD PRESSURE: 72 MMHG

## 2020-09-08 VITALS — SYSTOLIC BLOOD PRESSURE: 105 MMHG | DIASTOLIC BLOOD PRESSURE: 70 MMHG

## 2020-09-08 VITALS — DIASTOLIC BLOOD PRESSURE: 70 MMHG | SYSTOLIC BLOOD PRESSURE: 94 MMHG

## 2020-09-08 VITALS — SYSTOLIC BLOOD PRESSURE: 110 MMHG | DIASTOLIC BLOOD PRESSURE: 75 MMHG

## 2020-09-08 VITALS — DIASTOLIC BLOOD PRESSURE: 75 MMHG | SYSTOLIC BLOOD PRESSURE: 106 MMHG

## 2020-09-08 VITALS — SYSTOLIC BLOOD PRESSURE: 120 MMHG | DIASTOLIC BLOOD PRESSURE: 86 MMHG

## 2020-09-08 VITALS — SYSTOLIC BLOOD PRESSURE: 119 MMHG | DIASTOLIC BLOOD PRESSURE: 77 MMHG

## 2020-09-08 VITALS — DIASTOLIC BLOOD PRESSURE: 77 MMHG | SYSTOLIC BLOOD PRESSURE: 105 MMHG

## 2020-09-08 VITALS — DIASTOLIC BLOOD PRESSURE: 77 MMHG | SYSTOLIC BLOOD PRESSURE: 110 MMHG

## 2020-09-08 VITALS — SYSTOLIC BLOOD PRESSURE: 123 MMHG | DIASTOLIC BLOOD PRESSURE: 82 MMHG

## 2020-09-08 LAB
ALT SERPL-CCNC: 49 U/L (ref 10–68)
ANION GAP SERPL CALC-SCNC: 18 MMOL/L (ref 8–16)
BASOPHILS NFR BLD AUTO: 0.2 % (ref 0–2)
BUN SERPL-MCNC: 6 MG/DL (ref 7–18)
CALCIUM SERPL-MCNC: 9 MG/DL (ref 8.5–10.1)
CHLORIDE SERPL-SCNC: 97 MMOL/L (ref 98–107)
CHOLEST/HDLC SERPL: 4.2 RATIO (ref 2.3–4.1)
CK MB SERPL-MCNC: 386.1 U/L (ref 0–3.6)
CK SERPL-CCNC: 1605 UL (ref 21–215)
CO2 SERPL-SCNC: 21.3 MMOL/L (ref 21–32)
CREAT SERPL-MCNC: 0.7 MG/DL (ref 0.6–1.3)
EOSINOPHIL NFR BLD: 0.5 % (ref 0–7)
ERYTHROCYTE [DISTWIDTH] IN BLOOD BY AUTOMATED COUNT: 12.5 % (ref 11.5–14.5)
GLUCOSE SERPL-MCNC: 228 MG/DL (ref 74–106)
HCT VFR BLD CALC: 40.7 % (ref 36–48)
HDLC SERPL-MCNC: 48 MG/DL (ref 32–96)
HGB BLD-MCNC: 13.4 G/DL (ref 12–16)
IMM GRANULOCYTES NFR BLD: 0.3 % (ref 0–5)
LDL-HDL RATIO: 2.7 RATIO (ref 1.5–3.5)
LDLC SERPL-MCNC: 129 MG/DL (ref 0–100)
LYMPHOCYTES NFR BLD AUTO: 15.1 % (ref 15–50)
MCH RBC QN AUTO: 30.9 PG (ref 26–34)
MCHC RBC AUTO-ENTMCNC: 32.9 G/DL (ref 31–37)
MCV RBC: 93.8 FL (ref 80–100)
MONOCYTES NFR BLD: 7.8 % (ref 2–11)
NEUTROPHILS NFR BLD AUTO: 76.1 % (ref 40–80)
OSMOLALITY SERPL CALC.SUM OF ELEC: 270 MOSM/KG (ref 275–300)
PLATELET # BLD: 430 10X3/UL (ref 130–400)
PMV BLD AUTO: 10.2 FL (ref 7.4–10.4)
POTASSIUM SERPL-SCNC: 3.3 MMOL/L (ref 3.5–5.1)
RBC # BLD AUTO: 4.34 10X6/UL (ref 4–5.4)
SODIUM SERPL-SCNC: 133 MMOL/L (ref 136–145)
TRIGL SERPL-MCNC: 122 MG/DL (ref 30–200)
TROPONIN I SERPL-MCNC: 47.87 NG/ML (ref 0–0.06)
WBC # BLD AUTO: 13 10X3/UL (ref 4.8–10.8)

## 2020-09-08 PROCEDURE — 02703ZZ DILATION OF CORONARY ARTERY, ONE ARTERY, PERCUTANEOUS APPROACH: ICD-10-PCS | Performed by: INTERNAL MEDICINE

## 2020-09-08 NOTE — NUR
RECEIVED TROPONIN LAB RESULTS, 47.873. PAGED ON CALL FOR ST NOE MD TO NOTIFY
OF SITUATION AND LAB RESULT.

## 2020-09-08 NOTE — NUR
PTS BLOOD PRESSURE WAS STILL HIGH (152/100) AND PT STILL COMPLAINED OF CHEST
PAIN 8/10. ADMINISTERED ONE DOSE OF NITROGLYCERIN PER MD ORDERS. 5 MINS AFTER
ADMINISTRATION, /89, PAIN 7/10. PT REPORTS FEELING BETTER AND IS RESTING
QUIETLY IN BED. WILL CONT TO MONITOR.

## 2020-09-08 NOTE — MORECARE
CASE MANAGEMENT DISCHARGE SUMMARY
 
 
PATIENT: ALEXEY BAGLEY                        UNIT: [id number]
ACCOUNT#: [id number]                       ADM DATE: 20
AGE: 64     : 53  SEX: F            ROOM/BED: D.2101    
AUTHOR: ZEENAT,DOC                             PHYSICIAN:                               
 
REFERRING PHYSICIAN: JAN HUDDLESTON MD               
DATE OF SERVICE: 20
Discharge Plan
 
 
Patient Name: ALEXEY BAGLEY
Facility: [facility]:[place]
Encounter #: [id number]
Medical Record #: [id number]
: 1955
Planned Disposition: Skilled Nursing Facility
Anticipated Discharge Date: 
 
Discharge Date: 
Expected LOS: 
Initial Reviewer: [id number]
Initial Review Date: 2020
Generated: 20  12:28 pm 
Comments
 
DCP- Discharge Planning
 
Updated by [email or web address]: Maria Luz Heart on 20  10:26 am CT
I have left a message with Victor Manuel at [facility] to call regarding auth 
for admission and faxed additional clinical including most recent negative 
Covid test. I have called patient's  and was unable to leave a voice 
mail as his voice mailbox is full. CM will continue to follow and assist with 
discharge planning/needs.
DCP- Discharge Planning
 
Updated by [id number]: Maria Luz Heart on 9/3/20   8:17 am CT
I spoke with Victor Manuel with [facility] and refaxed paper work per his 
request.
DCP- Discharge Planning
 
Updated by [id number]: Maria Luz Heart on 20  12:38 pm CT
I called [facility] to check on status of referral, message left to call 
me back. I did speak with patient's , he states his home address is 
[address] [place] [address]. His wife was at a care home at [address] [place] [address]. 
He states that he is unable to care for his wife and the care home is not 
taking good care of her. He gives me his back up facilities at [facility] or [place]. I will continue to follow and assist with 
 
discharge planning/needs. Awaiting insurance auth.
DCP- Discharge Planning
 
Updated by [id number]: Maria Luz Heart on 20   6:36 am CT
I had left the [employer] form for patient's  to sign last night and it is 
not signed. I called patient's  and his voice mailbox is full and I am 
unable to leave a message. I called patient's room and she does not answer 
the phone.
DCP- Discharge Planning
 
Updated by [id number]: Nakia Luis Alfredo on 20   4:04 pm CT
Patient Name: ALEXEY BAGLEY                                     
Admission Status: ER   
Accout number: [id number]                              
Admission Date: 2020   
: 1955                                                        
Admission Diagnosis:   
Attending: JAN HUDDLESTON                                                
Current LOS:  2   
  
Anticipated DC Date:    
Planned Disposition:    
Primary Insurance: [employer] PPO   
  
  
Discharge Planning Comments: CM called and spoke with patient's  Jimmy.
  He stated that his wife lives in a private care home and her caregiver just 
tested positive for CoVID today. Plan was initially to discharge back to care 
home today but since her caregiver is ill.  Her spouse wants CM to try to get 
placement in NH facility for the time being. CK completed for [facility]. CM will continue to follow and assist as needed with discharge 
planning / needs. Patient will need to be quarantined for 14days to see if 
she develops any symptoms of COVID.  She will be placed in isolation while 
here in the hospital until placement can be arranged.   
  
  
  
  
  
  
: Nakia Lobato
 DCPIA - Discharge Planning Initial Assessment
 
Updated by [id number]: Nakia Lobato on 20   4:56 pm
*  Is the patient Alert and Oriented?
No
*  PCP
[facility]
*  Pharmacy
[facility]
*  Preadmission Environment
Group Home
 
*  [facility]
*  ADLs
Partial Dependent
*  Partial ADLs (Assistance needed)
Medication Management
*  List name and contact numbers for known caregivers / representatives who 
currently or will assist patient after discharge:
JIMMY CORBIN [phone]
*  Verbal permission to speak to the caregivers and representatives has been 
obtained from the patient.
Yes
*  Community resources currently utilized
None
*  Additional services required to return to the preadmission environment?
No
*  Can the patient safely return to the preadmission environment?
Yes
*  Has this patient been hospitalized within the prior 30 days at any 
hospital?
No
 
 
 
 
 
 
 
Last DP export: 20  10:14 am
Patient Name: ALEXEY BAGLEY
Encounter #: [id number]
Page [phone]
 
 
 
 
 
Electronically Signed by JASMYNE EPPERSON on 20 at 1128
 
 
 
 
 
 
**All edits/amendments must be made on the electronic document**
 
DICTATION DATE: 20     : SENDY  20     
RPT#: [id number]                                DC DATE:        
                                               STATUS: ADM IN  
[facility]
 [place] [address]
***END OF REPORT***

## 2020-09-08 NOTE — NUR
RIGHT GROIN DRESSING C/D/I. NO S/S OF HEMATOMA NOTED. SHEATH INTACT AND
INFUSING PER ORDERS WITH NS AND INTEGRILIN. TOLERATING WELL.

## 2020-09-08 NOTE — NUR
PT RECEIVED AWAKE IN BED BUT SOMEWHAT LETHARGIC, HAVING TO PROMPT TO MOVE FOR
TELEMETRY AND MED TAKING.  POTASSIUM REPLACED, GOING TO CATH LAB TODAY.  IV TO
BE STARTED FOR PROCEDURE.

## 2020-09-08 NOTE — NUR
REPORT CALLED TO FAITH GARCIA RN. NOTIFIED MED 2 OF PT'S TRANSFER TO ROOM
2308 AND SPOKE WITH FREDI PAINTER. HE REPORTS HE HAS HER BELONGINGS AND WILL BRING
THEM OVER TO HER NEW ROOM. HE WILL ALSO CALL FAITH AND GIVE HER REPORT. PT
SUPINE IN BED. . /69. RESP 21. O2 SAT 99% ON ROOM AIR. RIGHT GROIN
DRESSING C/D/I. NO S/S OF HEMATOMA NOTED. RIGHT GROIN SHEATH IN PLACE.
INFUSING PER MD ORDERS. RIGHT LEG PINK, WARM TO TOUCH, AND CAP REFILL < 3
SECS.

## 2020-09-08 NOTE — NUR
PT ARRIVED IN RECOVERY BY BED. PLACED ON MONITORS. PT IS DROWSY. AWAKENS TO
VOICE AND FOLLOWS COMMANDS. PT SINUS TACH ON MONITOR. . /70. RESP
19. O2 % ON 2LNC. RIGHT GROIN SITE DRESSING C/D/I. NO S/S OF HEMATOMA
NOTED. RIGHT PEDAL PULSE FOUND WITH DOPPLER. WAITING ON ICU BED AT THIS TIME.
PT IS ISOLATION PT FOR PUI.

## 2020-09-08 NOTE — NUR
Nutrition Follow-up:
NPO for cath. Eating well otherwise.
Diet: NPO
PO intake: 96% avg x 6 meals
Wt: 103.6# (8/31)
Labs noted: Na 133, K+ 3.3, Glu 228, Chol 201
Meds noted: Lantus, Humalog, electrolyte protocol
-Rec resume diet as medically feasible following procedure.
-Monitor wt.
-RD following.

## 2020-09-08 NOTE — NUR
PT ON BEDPAN. VOIDED APPROX 300cc OF CLEAR YELLOW URINE WITHOUT DIFFICULTY.
KARRIE-CARE GIVEN. RIGHT GROIN DRESSING C/D/I. NO S/S OF HEMATOMA NOTED. RIGHT
GROIN SHEATH IN PLACE. DIGOXIN 0.0125 MG IV GIVEN PER MD ORDERS. TOLERATED
WELL. PT IS CONFUSED. CONTINUE TO REMIND HER TO LAY FLAT AND NOT BEND RIGHT
LEG. SHE VOICES UNDERSTANDING. RIGHT LEG WARM TO TOUCH. COLOR PINK. CAP REFILL
< 3 SECS.

## 2020-09-08 NOTE — NUR
ST NOE MD ANSWERED PAGE. NOTIFIED HIM OF THE SITUATION AND TROPONIN LEVEL.
NO NEW ORDERS WERE GIVEN. WILL CONT TO MONITOR.

## 2020-09-08 NOTE — NUR
RIGHT GROIN DRESSING C/D/I. NO S/S OF HEMATOMA NOTED. CALL LIGHT WITHIN REACH.
RIGHT PEDAL PULSE STILL FOUND WITH DOPPLER. RIGHT LEG WARM TO TOUCH.

## 2020-09-09 VITALS — DIASTOLIC BLOOD PRESSURE: 54 MMHG | SYSTOLIC BLOOD PRESSURE: 80 MMHG

## 2020-09-09 VITALS — DIASTOLIC BLOOD PRESSURE: 71 MMHG | SYSTOLIC BLOOD PRESSURE: 102 MMHG

## 2020-09-09 VITALS — SYSTOLIC BLOOD PRESSURE: 96 MMHG | DIASTOLIC BLOOD PRESSURE: 66 MMHG

## 2020-09-09 VITALS — SYSTOLIC BLOOD PRESSURE: 90 MMHG | DIASTOLIC BLOOD PRESSURE: 64 MMHG

## 2020-09-09 VITALS — SYSTOLIC BLOOD PRESSURE: 84 MMHG | DIASTOLIC BLOOD PRESSURE: 62 MMHG

## 2020-09-09 VITALS — DIASTOLIC BLOOD PRESSURE: 72 MMHG | SYSTOLIC BLOOD PRESSURE: 98 MMHG

## 2020-09-09 VITALS — DIASTOLIC BLOOD PRESSURE: 55 MMHG | SYSTOLIC BLOOD PRESSURE: 94 MMHG

## 2020-09-09 VITALS — DIASTOLIC BLOOD PRESSURE: 68 MMHG | SYSTOLIC BLOOD PRESSURE: 98 MMHG

## 2020-09-09 VITALS — DIASTOLIC BLOOD PRESSURE: 64 MMHG | SYSTOLIC BLOOD PRESSURE: 101 MMHG

## 2020-09-09 VITALS — DIASTOLIC BLOOD PRESSURE: 64 MMHG | SYSTOLIC BLOOD PRESSURE: 90 MMHG

## 2020-09-09 VITALS — DIASTOLIC BLOOD PRESSURE: 63 MMHG | SYSTOLIC BLOOD PRESSURE: 84 MMHG

## 2020-09-09 VITALS — SYSTOLIC BLOOD PRESSURE: 91 MMHG | DIASTOLIC BLOOD PRESSURE: 68 MMHG

## 2020-09-09 VITALS — DIASTOLIC BLOOD PRESSURE: 67 MMHG | SYSTOLIC BLOOD PRESSURE: 97 MMHG

## 2020-09-09 VITALS — DIASTOLIC BLOOD PRESSURE: 66 MMHG | SYSTOLIC BLOOD PRESSURE: 102 MMHG

## 2020-09-09 NOTE — MORECARE
CASE MANAGEMENT DISCHARGE SUMMARY
 
 
PATIENT: ALEXEY BAGLEY                        UNIT: [id number]
ACCOUNT#: [id number]                       ADM DATE: 20
AGE: 64     : [birth year]  SEX: F            ROOM/BED: D.2132    
AUTHOR: JASMYNE EPPERSON                             PHYSICIAN:                               
 
REFERRING PHYSICIAN: JAN HUDDLESTON MD               
DATE OF SERVICE: 20
Discharge Plan
 
 
Patient Name: ALEXEY BAGLEY
Facility: [facility]:[place]
Encounter #: [id number]
Medical Record #: [id number]
: 1955
Planned Disposition: Skilled Nursing Facility
Anticipated Discharge Date: 
 
Discharge Date: 
Expected LOS: 
Initial Reviewer: [email or web address]
Initial Review Date: 2020
Generated: 20   4:49 pm 
Comments
 
DCP- Discharge Planning
 
Updated by [id number]: Nakia Lobato on 20   2:41 pm CT
Spoke with Victor Manuel at [facility] he stated that he would be here tomorrow 
to evaluate patient.  CM will continue to follow and assist as needed with 
discharge planning / needs.
DCP- Discharge Planning
 
Updated by [email or web address]: Maria Luz Heart on 20  10:26 am CT
I have left a message with Victor Manuel at [facility] to call regarding auth 
for admission and faxed additional clinical including most recent negative 
Covid test. I have called patient's  and was unable to leave a voice 
mail as his voice mailbox is full. CM will continue to follow and assist with 
discharge planning/needs.
DCP- Discharge Planning
 
Updated by [id number]: Maria Luz Heart on 9/3/20   8:17 am CT
I spoke with Victor Manuel with [facility] and refaxed paper work per his 
request.
DCP- Discharge Planning
 
Updated by [email or web address]: Maria Luz Heart on 20  12:38 pm CT
 
I called [facility] to check on status of referral, message left to call 
me back. I did speak with patient's , he states his home address is 
[address] [place] [address]. His wife was at a care home at [address] [place] [address]. 
He states that he is unable to care for his wife and the care home is not 
taking good care of her. He gives me his back up facilities at [facility] or [facility]. I will continue to follow and assist with 
discharge planning/needs. Awaiting insurance auth.
DCP- Discharge Planning
 
Updated by [id number]: Maria Luz Heart on 20   6:36 am CT
I had left the [facility] form for patient's  to sign last night and it is 
not signed. I called patient's  and his voice mailbox is full and I am 
unable to leave a message. I called patient's room and she does not answer 
the phone.
DCP- Discharge Planning
 
Updated by [id number]: Nakia Lobato on 20   4:04 pm CT
Patient Name: ALEXEY BAGLEY                                     
Admission Status: ER   
Accout number: [id number]                              
Admission Date: 2020   
: 1955                                                        
Admission Diagnosis:   
Attending: JAN HUDDLESTON                                                
Current LOS:  2   
  
Anticipated DC Date:    
Planned Disposition:    
Primary Insurance: [employer] PPO   
  
  
Discharge Planning Comments: CM called and spoke with patient's  Jimmy.
  He stated that his wife lives in a private care home and her caregiver just 
tested positive for CoVID today. Plan was initially to discharge back to care 
home today but since her caregiver is ill.  Her spouse wants CM to try to get 
placement in NH facility for the time being. CK completed for [facility]. CM will continue to follow and assist as needed with discharge 
planning / needs. Patient will need to be quarantined for 14days to see if 
she develops any symptoms of COVID.  She will be placed in isolation while 
here in the hospital until placement can be arranged.   
  
  
  
  
  
  
: Nakia Lobato
 DCPIA - Discharge Planning Initial Assessment
 
Updated by [email or web address]: Nakia Lobato on 20   4:56 pm
*  Is the patient Alert and Oriented?
No
 
*  PCP
[facility]
*  Pharmacy
[facility]
*  Preadmission Environment
Group Home
*  [facility]
*  ADLs
Partial Dependent
*  Partial ADLs (Assistance needed)
Medication Management
*  List name and contact numbers for known caregivers / representatives who 
currently or will assist patient after discharge:
JIMMYBHARAT CORBIN [phone]
*  Verbal permission to speak to the caregivers and representatives has been 
obtained from the patient.
Yes
*  Community resources currently utilized
None
*  Additional services required to return to the preadmission environment?
No
*  Can the patient safely return to the preadmission environment?
Yes
*  Has this patient been hospitalized within the prior 30 days at any 
hospital?
No
 
 
 
 
 
 
 
Last DP export: 20  10:28 am
Patient Name: ALEXEY BAGLEY
 
Encounter #: [id number]
Page [id number]
 
 
 
 
 
Electronically Signed by JASMYNE EPPERSON on 20 at 1549
 
 
 
 
 
 
**All edits/amendments must be made on the electronic document**
 
DICTATION DATE: 20     : SENDY  20     
RPT#: [id number]                                DC DATE:        
                                               STATUS: ADM IN  
[facility]
 [place] [address]
***END OF REPORT***

## 2020-09-09 NOTE — OP
PATIENT NAME:  ALEXEY BAGLEY                              MEDICAL RECORD: [id number]
:53                                             LOCATION:D[facility]    D.2308
                                                         ADMISSION DATE:20
SURGEON:  KADEN HERMOSILLO MD          
 
 
DATE OF OPERATION:  2020
 
PROCEDURE:  Left heart catheterization, selective coronary angiography, plus
PTCA stenting to the LAD plus aortofemoral runoff, right femoral artery
approach.
 
CATHETERS:  A 5-Anguillan sheath exchanged for a long 6-Anguillan sheath as well as an
XB LAD guide catheter.
 
The procedure well tolerated.  The patient returned to castanon.  Sheath removed. 
Adequate hemostasis obtained.  Aortofemoral runoff.  Radial femoral approach was
performed secondary to marked difficulty navigating the iliacs and aorta itself.
 This showed stenosis of abdominal aorta itself of 80% with a true lumen located
to the right in the AP view
 
LEFT ILIAC:  Previously placed stent.  It is patent with about a 70% restenosis.
 
RIGHT ILIAC:  Patent but with about a 90% restenosis.
 
LV&C:  Left heart catheterization, selective coronary angiography.  LV shows
marked hypokinesis of the anterior apex and true apical region.  Overall
function reduced 25%.
 
CORONARY ANATOMY:
LEFT MAIN:  Free of disease.
LAD:  A previously placed stent is totally occluded with JUDE flow distally 0.
CIRCUMFLEX:  Free of disease.
RIGHT CORONARY ARTERY:  Rudimentary, has about 80% stenosis.
 
PLAN:  Intervention momentarily.
 
DESCRIPTION OF PROCEDURE:  Using a long 6-Anguillan sheath, XB LAD guide catheter
provided good guide catheter support followed by 300 cm Whisper wire was placed,
totally occluded LAD, distal portion of vessel.  Balloon used was a 3.0 x 15 mm
Euphora balloon up and down the LAD.  In addition, the patient was given IC
Integrilin.  We were able to establish JUDE flow 3 to about the distal third of
the vessel; however, due to marked thrombus burden, the patient was started on
Integrilin drip.  Sheath closed at this point in time.  Hopefully, with
Integrilin, this will clean up extended thrombus burden.  Plavix had previously
been reloaded.
 
[id number] Voice Confirmation ID: [id number] DOCUMENT ID: [id number]
                                           
                                           KADEN HERMOSILLO MD          
 
 
 
Electronically Signed by KADEN HERMOSILLO on 20 at 0911
CC:                                                             [id number]
DICTATION DATE: 20 1057     :     20 1815      ADM IN  
                                                                              
[place] [address] [place] [address]

## 2020-09-10 VITALS — SYSTOLIC BLOOD PRESSURE: 88 MMHG | DIASTOLIC BLOOD PRESSURE: 52 MMHG

## 2020-09-10 VITALS — DIASTOLIC BLOOD PRESSURE: 60 MMHG | SYSTOLIC BLOOD PRESSURE: 87 MMHG

## 2020-09-10 VITALS — DIASTOLIC BLOOD PRESSURE: 51 MMHG | SYSTOLIC BLOOD PRESSURE: 78 MMHG

## 2020-09-10 VITALS — SYSTOLIC BLOOD PRESSURE: 92 MMHG | DIASTOLIC BLOOD PRESSURE: 64 MMHG

## 2020-09-10 LAB
ANION GAP SERPL CALC-SCNC: 12.2 MMOL/L (ref 8–16)
BASOPHILS NFR BLD AUTO: 0.2 % (ref 0–2)
BUN SERPL-MCNC: 6 MG/DL (ref 7–18)
CALCIUM SERPL-MCNC: 7.5 MG/DL (ref 8.5–10.1)
CHLORIDE SERPL-SCNC: 104 MMOL/L (ref 98–107)
CO2 SERPL-SCNC: 21.5 MMOL/L (ref 21–32)
CREAT SERPL-MCNC: 0.7 MG/DL (ref 0.6–1.3)
EOSINOPHIL NFR BLD: 1.7 % (ref 0–7)
ERYTHROCYTE [DISTWIDTH] IN BLOOD BY AUTOMATED COUNT: 12.8 % (ref 11.5–14.5)
GLUCOSE SERPL-MCNC: 267 MG/DL (ref 74–106)
HCT VFR BLD CALC: 29.5 % (ref 36–48)
HGB BLD-MCNC: 9.8 G/DL (ref 12–16)
IMM GRANULOCYTES NFR BLD: 0.3 % (ref 0–5)
LYMPHOCYTES NFR BLD AUTO: 23 % (ref 15–50)
MCH RBC QN AUTO: 31 PG (ref 26–34)
MCHC RBC AUTO-ENTMCNC: 33.2 G/DL (ref 31–37)
MCV RBC: 93.4 FL (ref 80–100)
MONOCYTES NFR BLD: 10.6 % (ref 2–11)
NEUTROPHILS NFR BLD AUTO: 64.2 % (ref 40–80)
OSMOLALITY SERPL CALC.SUM OF ELEC: 274 MOSM/KG (ref 275–300)
PLATELET # BLD: 308 10X3/UL (ref 130–400)
PMV BLD AUTO: 9.7 FL (ref 7.4–10.4)
POTASSIUM SERPL-SCNC: 3.7 MMOL/L (ref 3.5–5.1)
RBC # BLD AUTO: 3.16 10X6/UL (ref 4–5.4)
SODIUM SERPL-SCNC: 134 MMOL/L (ref 136–145)
WBC # BLD AUTO: 9.8 10X3/UL (ref 4.8–10.8)

## 2020-09-10 NOTE — NUR
ALERT AND ORIENTED X4. SITTING UP IN BED. BED BATH AND LINEN CHANGE COMPLETE.
AGREES TO HAVE BP CHECKED AND TAKE MEDICATIONS IF ABLE TO HAVE PAIN
MEDICATION. BP-103/65. ADMINISTER ASA, PLAVIX, PROAMITINE, AND NORCO. REFUSE
TREATMENT FOR FSBS 165. CONTINUE PLAN OF CARE AND SAFETY PRECAUTIONS.

## 2020-09-10 NOTE — NUR
PT IN BED, AAO X 1, PT CONFUSED AT THIS TIME, RESP EVEN AND UNLABORED, NO
DISTRESS NOTED, CL IN REACH, SR UP X 2.

## 2020-09-10 NOTE — NUR
ALERT AND ORIENTED X4. RESTING IN BED. REFUSE TO TAKE MEDICATIONS UNTIL
SLEEPING PILL GIVEN. EXPLAIN BP TOO LOW AND NO SLEEPING MEDICATIONS ORDERED.
REFUSE VITAL SIGNS. REFUSE GLUCOSE CHECK. YELLS OUT, "I WANT TO GO HOME."

## 2020-09-11 VITALS — DIASTOLIC BLOOD PRESSURE: 70 MMHG | SYSTOLIC BLOOD PRESSURE: 112 MMHG

## 2020-09-11 VITALS — DIASTOLIC BLOOD PRESSURE: 62 MMHG | SYSTOLIC BLOOD PRESSURE: 109 MMHG

## 2020-09-11 NOTE — NUR
REPORT RECEIVED, WILL CONTINUE POC. PATIENT IS AAOX2, SITTING UP IN BED. NO
S/S OF DISTRESS OBSERVED, RR EVEN AND UNLABORED ON ROOM AIR. NO IV ACCESS.
PATIENT IS LT BKA. PATIENT DENIES FURTHER NEEDS AT THIS TIME. CL IN REACH, BED
LOCKED AND LOWERED. PUI PRECAUTIONS MAINTAINED. WILL CTM.

## 2020-09-11 NOTE — NUR
FOUND PATIENT IN HALLWAY ROLLING AROUND IN RECLINING CHAIR. INFORMED PATIENT
SHE MUST REMAIN IN HER ROOM WHILE UNDER ISOLATION PRECAUTIONS. PATIENT
PROCEEDS TO EXPLAIN HOW NOBODY KNOWS WHERE SHE IS AND THAT HER DRGucci SAID SHE
WAS LEAVING. INFORMING PATIENT SHE DOES NOT HAVE DISCHARGE ORDERS FOR TONIGHT
AND THAT THIS NURSE WOULD ASSIST HER IN CALLING HER FAMILY. PATIENT REQUESTED
A SANDWICH AND CUP OF COFFEE, PROVIDED. ASSISTED PATIENT WITH CALLING HER
. ADMINISTERED HS MEDS WITHOUT DIFFICULTY. PATIENT DENIES FURTHER NEEDS
AT THIS TIME.

## 2020-09-11 NOTE — MORECARE
CASE MANAGEMENT DISCHARGE SUMMARY
 
 
PATIENT: ALEXEY BAGLEY                        UNIT: [id number]
ACCOUNT#: [id number]                       ADM DATE: 20
AGE: 64     : 53  SEX: F            ROOM/BED: D.2132    
AUTHOR: JASMYNE EPPERSON                             PHYSICIAN:                               
 
REFERRING PHYSICIAN: JAN HUDDLESTON MD               
DATE OF SERVICE: 20
Discharge Plan
 
 
Patient Name: ALEXEY BAGLEY
Facility: [facility]:[place]
Encounter #: [id number]
Medical Record #: [id number]
: 1955
Planned Disposition: Skilled Nursing Facility
Anticipated Discharge Date: 20
 
Discharge Date: 
Expected LOS: 16
Initial Reviewer: [id number]
Initial Review Date: 2020
Generated: 20   1:50 pm 
DCP- Discharge Planning
 
Updated by [email or web address]: Nakia Lobato on 20   2:41 pm CT
Spoke with Victor Manuel at [facility] he stated that he would be here tomorrow 
to evaluate patient.  CM will continue to follow and assist as needed with 
discharge planning / needs.
DCP- Discharge Planning
 
Updated by [id number]: Maria Luz Heart on 20  10:26 am CT
I have left a message with Victor Manuel at [facility] to call regarding auth 
for admission and faxed additional clinical including most recent negative 
Covid test. I have called patient's  and was unable to leave a voice 
mail as his voice mailbox is full. CM will continue to follow and assist with 
discharge planning/needs.
DCP- Discharge Planning
 
Updated by [id number]: Maria Luz Heart on 9/3/20   8:17 am CT
I spoke with Victor Manuel with [facility] and refaxed paper work per his 
request.
DCP- Discharge Planning
 
Updated by [id number]: Maria Luz Heart on 20  12:38 pm CT
I called [facility] to check on status of referral, message left to call 
me back. I did speak with patient's , he states his home address is 
 
[address] [place] [address]. His wife was at a care home at [address] [place] [address]. 
He states that he is unable to care for his wife and the care home is not 
taking good care of her. He gives me his back up facilities at [facility] or [facility]. I will continue to follow and assist with 
discharge planning/needs. Awaiting insurance auth.
DCP- Discharge Planning
 
Updated by [id number]: Maria Luz Heart on 20   6:36 am CT
I had left the [facility] form for patient's  to sign last night and it is 
not signed. I called patient's  and his voice mailbox is full and I am 
unable to leave a message. I called patient's room and she does not answer 
the phone.
DCP- Discharge Planning
 
Updated by [id number]: aNkia Lobato on 20   4:04 pm CT
Patient Name: ALEXEY BAGLEY                                     
Admission Status: ER   
Accout number: [id number]                              
Admission Date: 2020   
: 1955                                                        
Admission Diagnosis:   
Attending: JAN HUDDLESTON                                                
Current LOS:  2   
  
Anticipated DC Date:    
Planned Disposition:    
Primary Insurance: [employer] PPO   
  
  
Discharge Planning Comments: CM called and spoke with patient's  Jimmy.
  He stated that his wife lives in a private care home and her caregiver just 
tested positive for CoVID today. Plan was initially to discharge back to care 
home today but since her caregiver is ill.  Her spouse wants CM to try to get 
placement in NH facility for the time being. CK completed for [facility]. CM will continue to follow and assist as needed with discharge 
planning / needs. Patient will need to be quarantined for 14days to see if 
she develops any symptoms of COVID.  She will be placed in isolation while 
here in the hospital until placement can be arranged.   
  
  
  
  
  
  
: Nakia Lobato
 DCPIA - Discharge Planning Initial Assessment
 
Updated by [id number]: Nakia Lobato on 20   4:56 pm
*  Is the patient Alert and Oriented?
No
*  PCP
[facility]
 
*  Pharmacy
[facility]
*  Preadmission Environment
Group Home
*  [facility]
*  ADLs
Partial Dependent
*  Partial ADLs (Assistance needed)
Medication Management
*  List name and contact numbers for known caregivers / representatives who 
currently or will assist patient after discharge:
JIMMY QUINONESLACH [phone]
*  Verbal permission to speak to the caregivers and representatives has been 
obtained from the patient.
Yes
*  Community resources currently utilized
None
*  Additional services required to return to the preadmission environment?
No
*  Can the patient safely return to the preadmission environment?
Yes
*  Has this patient been hospitalized within the prior 30 days at any 
hospital?
No
 
 
 
 
 
 
 
Last DP export: 20   2:49 pm
Patient Name: ALEXEY BAGLEY
 
Encounter #: [id number]
Page [phone]
 
 
 
 
 
Electronically Signed by JASMYNE EPPERSON on 20 at 1250
 
 
 
 
 
 
**All edits/amendments must be made on the electronic document**
 
DICTATION DATE: 20     : SENDY  20     
RPT#: [id number]                                DC DATE:        
                                               STATUS: ADM IN  
[facility]
 [place] [address]
***END OF REPORT***

## 2020-09-12 VITALS — SYSTOLIC BLOOD PRESSURE: 96 MMHG | DIASTOLIC BLOOD PRESSURE: 62 MMHG

## 2020-09-12 VITALS — DIASTOLIC BLOOD PRESSURE: 73 MMHG | SYSTOLIC BLOOD PRESSURE: 118 MMHG

## 2020-09-12 VITALS — SYSTOLIC BLOOD PRESSURE: 125 MMHG | DIASTOLIC BLOOD PRESSURE: 65 MMHG

## 2020-09-12 VITALS — DIASTOLIC BLOOD PRESSURE: 68 MMHG | SYSTOLIC BLOOD PRESSURE: 104 MMHG

## 2020-09-12 NOTE — NUR
PT RECEIVED AWAKE AND ALERT ROLLING SELF OUT IN CADET WHILE SITTING IN
RECLINER.  INSTRUCTED TO REMAIN IN ROOM, COFFEE BROUGHT TO HER.

## 2020-09-13 VITALS — SYSTOLIC BLOOD PRESSURE: 118 MMHG | DIASTOLIC BLOOD PRESSURE: 57 MMHG

## 2020-09-13 VITALS — DIASTOLIC BLOOD PRESSURE: 67 MMHG | SYSTOLIC BLOOD PRESSURE: 115 MMHG

## 2020-09-13 VITALS — SYSTOLIC BLOOD PRESSURE: 97 MMHG | DIASTOLIC BLOOD PRESSURE: 65 MMHG

## 2020-09-13 VITALS — SYSTOLIC BLOOD PRESSURE: 79 MMHG | DIASTOLIC BLOOD PRESSURE: 54 MMHG

## 2020-09-13 VITALS — DIASTOLIC BLOOD PRESSURE: 63 MMHG | SYSTOLIC BLOOD PRESSURE: 120 MMHG

## 2020-09-13 VITALS — DIASTOLIC BLOOD PRESSURE: 64 MMHG | SYSTOLIC BLOOD PRESSURE: 94 MMHG

## 2020-09-13 NOTE — NUR
REPORT RECEIVED, PT CARE ASSUMED. INTRODUCED SELF AND WROTE NAME ON BOARD. PT
LYING IN BED WITH EYES CLOSED, RR EVEN AND NONLABORED, NO S/S OF DISTRESS,
AROUSES EASILY TO VOICE. REQUESTING COFFEE, PROVIDED. DENIES ANY OTHER NEEDS
AT THIS TIME. BED IN LOWEST, SRX2, CALL LIGHT WITHIN REACH. WILL CTM.

## 2020-09-13 NOTE — NUR
RECEIVE SHIFT REPORT. RESTING IN BED WITH EYES CLOSED. TV ON. NO SIGNS OF
DISTRESS. WILL CONTINUE PLAN OF CARE AND SAFETY PRECAUTIONS.

## 2020-09-14 VITALS — SYSTOLIC BLOOD PRESSURE: 103 MMHG | DIASTOLIC BLOOD PRESSURE: 69 MMHG

## 2020-09-14 VITALS — DIASTOLIC BLOOD PRESSURE: 61 MMHG | SYSTOLIC BLOOD PRESSURE: 114 MMHG

## 2020-09-14 NOTE — NUR
Patient remains asymptomatic for COVID 19
and has completed 14 days of quarantine.
Isolation may be discontinued. No

## 2020-09-14 NOTE — NUR
Nutrition Follow-up:
PO intake fluctuating. Noted plans to d/c.
Diet: Diabetic
PO intake: 67% avg x 3 meals yesterday (%)
No new wt; last wt: 103.6# (8/31)
Labs reviewed
Meds noted: Lantus, Humalog, electrolyte protocol
-Encourage PO intake and honor food preferences within diet restrictions.
-Need new wt.
-RD following.

## 2020-09-14 NOTE — MORECARE
CASE MANAGEMENT DISCHARGE SUMMARY
 
 
PATIENT: ALEXEY BAGLEY                        UNIT: [id number]
ACCOUNT#: [id number]                       ADM DATE: 20
AGE: 64     : 53  SEX: F            ROOM/BED: D.8106    
AUTHOR: ZEENAT,DOC                             PHYSICIAN:                               
 
REFERRING PHYSICIAN: JAN HUDDLESTON MD               
DATE OF SERVICE: 20
Discharge Plan
 
 
Patient Name: ALEXEY BAGLEY
Facility: [facility]:[place]
Encounter #: [id number]
Medical Record #: [id number]
: 1955
Planned Disposition: Skilled Nursing Facility
Anticipated Discharge Date: 20
 
Discharge Date: 
Expected LOS: 16
Initial Reviewer: [id number]
Initial Review Date: 2020
Generated: 20  10:16 am 
Comments
 
DCP- Discharge Planning
 
Updated by [email or web address]: Dulce Carter on 20   8:13 am CT
CM called [facility] at [phone] and spoke to Brittanie about 
pending discharge.  Brittanie states that the facility is accepting the patient 
today but paperwork will need to be filled out prior to admission.  Brittanie 
states she will have Victor Manuel call CM with details about pending admission.  
  
Dulce Carter
DCP- Discharge Planning
 
Updated by [email or web address]: Nakia Lobato on 20   2:41 pm CT
Spoke with Victor Manuel at [facility] he stated that he would be here tomorrow 
to evaluate patient.  CM will continue to follow and assist as needed with 
discharge planning / needs.
DCP- Discharge Planning
 
Updated by [email or web address]: Maria Luz Heart on 20  10:26 am CT
I have left a message with Victor Manuel at [facility] to call regarding auth 
for admission and faxed additional clinical including most recent negative 
Covid test. I have called patient's  and was unable to leave a voice 
mail as his voice mailbox is full. CM will continue to follow and assist with 
 
discharge planning/needs.
DCP- Discharge Planning
 
Updated by [email or web address]: Maria Luz Heart on 9/3/20   8:17 am CT
I spoke with Victor Manuel with [facility] and refaxed paper work per his 
request.
DCP- Discharge Planning
 
Updated by [id number]: Maria Luz Heart on 20  12:38 pm CT
I called [facility] to check on status of referral, message left to call 
me back. I did speak with patient's , he states his home address is 
[address] [place] [address]. His wife was at a care home at [address] [place] [address]. 
He states that he is unable to care for his wife and the care home is not 
taking good care of her. He gives me his back up facilities at [facility] or [place] [facility]. I will continue to follow and assist with 
discharge planning/needs. Awaiting insurance auth.
DCP- Discharge Planning
 
Updated by [id number]: Maria Luz Heart on 20   6:36 am CT
I had left the [facility] form for patient's  to sign last night and it is 
not signed. I called patient's  and his voice mailbox is full and I am 
unable to leave a message. I called patient's room and she does not answer 
the phone.
DCP- Discharge Planning
 
Updated by [id number]: Nakia Lobato on 20   4:04 pm CT
Patient Name: ALEXEY BAGLEY                                     
Admission Status: ER   
Accout number: [id number]                              
Admission Date: 2020   
: 1955                                                        
Admission Diagnosis:   
Attending: JAN HUDDLESTON                                                
Current LOS:  2   
  
Anticipated DC Date:    
Planned Disposition:    
Primary Insurance: [employer] PPO   
  
  
Discharge Planning Comments: CM called and spoke with patient's  Jimmy.
  He stated that his wife lives in a private care home and her caregiver just 
tested positive for CoVID today. Plan was initially to discharge back to care 
home today but since her caregiver is ill.  Her spouse wants CM to try to get 
placement in NH facility for the time being. [facility] completed for [facility]. CM will continue to follow and assist as needed with discharge 
planning / needs. Patient will need to be quarantined for 14days to see if 
she develops any symptoms of COVID.  She will be placed in isolation while 
here in the hospital until placement can be arranged.   
  
  
  
 
  
  
  
: Nakia Lobato
 DCPIA - Discharge Planning Initial Assessment
 
Updated by [id number]: Nakia Lobato on 20   4:56 pm
*  Is the patient Alert and Oriented?
No
*  PCP
[facility]
*  Pharmacy
[facility]
*  Preadmission Environment
Group Home
*  [facility]
*  ADLs
Partial Dependent
*  Partial ADLs (Assistance needed)
Medication Management
*  List name and contact numbers for known caregivers / representatives who 
currently or will assist patient after discharge:
JIMMY CORBIN [phone]
*  Verbal permission to speak to the caregivers and representatives has been 
obtained from the patient.
Yes
*  Community resources currently utilized
None
*  Additional services required to return to the preadmission environment?
No
*  Can the patient safely return to the preadmission environment?
Yes
*  Has this patient been hospitalized within the prior 30 days at any 
hospital?
No
 
 
 
 
 
 
 
Last DP export: 20  11:50 a
Patient Name: ALEXEY BAGLEY
 
Encounter #: [id number]
Page [phone]
 
 
 
 
 
Electronically Signed by JASMYNE EPPERSON on 20 at 0916
 
 
 
 
 
 
**All edits/amendments must be made on the electronic document**
 
DICTATION DATE: 20     : SENDY  20     
RPT#: [id number]                                DC DATE:        
                                               STATUS: ADM IN  
[facility]
 [place] [address]
***END OF REPORT***

## 2020-09-14 NOTE — MORECARE
CASE MANAGEMENT DISCHARGE SUMMARY
 
 
PATIENT: ALEXEY BAGLEY                        UNIT: [id number]
ACCOUNT#: [id number]                       ADM DATE: 20
AGE: 64     : [birth year]  SEX: F            ROOM/BED: D.4609    
AUTHOR: JASMYNE EPPERSON                             PHYSICIAN:                               
 
REFERRING PHYSICIAN: JAN HUDDLESTON MD               
DATE OF SERVICE: 20
Discharge Plan
 
 
Patient Name: ALEXEY BAGLEY
Facility: [facility]
Encounter #: [id number]
Medical Record #: [id number]
: 1955
Planned Disposition: Skilled Nursing Facility
Anticipated Discharge Date: 20
 
Discharge Date: 
Expected LOS: 16
Initial Reviewer: [id number]
Initial Review Date: 2020
Generated: 20   6:56 pm 
Comments
 
DCP- Discharge Planning
 
Updated by [email or web address]: Dulce Carter on 20   4:55 pm CT
Patient Name:  ALEXEY BAGLEY   
Encounter No:  [id number]   
:  1955   
Primary Insurance:  [employer] PPO  
Anticipated DC Date: 2020   
Planned Disposition:  Skilled Nursing Facility  
External Planned Provider: :   
  
  
DCP follow-up note: SHEILA received call from Glory at [facility] who states 
he received a phone call from pt  who states she is refusing to go to 
[facility] and hung up on him.  glory states he is unable to do eval if 
pt is refusing to go.  Sheila met with pt in room.  Patient states is not 
refusing to go to [facility], but she will require her things such as 
clothes.  Cm told pt she will be allowed to have her things, after she comes 
out of isolation.  patient states she is ready to go to [facility].  Sheila 
called glory back to update.  Glory states he will be buy in the morning to 
evaluate pt and get forms signed at 0830 9/15.  Patient and family in 
agreement with discharge plan. No changes to plan. Case management will 
 
follow and assist as needed.  
Dulce Carter
DCP- Discharge Planning
 
Updated by [email or web address]: Dulce Carter on 20   8:13 am CT
CM called [facility] at [phone] and spoke to Brittanie about 
pending discharge.  Brittanie states that the facility is accepting the patient 
today but paperwork will need to be filled out prior to admission.  Brittanie 
states she will have Glory call CM with details about pending admission.  
  
Dulce Carter
DCP- Discharge Planning
 
Updated by [id number]: Nakia Lobato on 20   2:41 pm CT
Spoke with Glory at [facility] he stated that he would be here tomorrow 
to evaluate patient.  CM will continue to follow and assist as needed with 
discharge planning / needs.
DCP- Discharge Planning
 
Updated by [id number]: Maria Luz Heart on 20  10:26 am CT
I have left a message with Glory at [facility] to call regarding auth 
for admission and faxed additional clinical including most recent negative 
Covid test. I have called patient's  and was unable to leave a voice 
mail as his voice mailbox is full. CM will continue to follow and assist with 
discharge planning/needs.
DCP- Discharge Planning
 
Updated by [email or web address]: Maria Luz Heart on 9/3/20   8:17 am CT
I spoke with Glory with [facility] and refaxed paper work per his 
request.
DCP- Discharge Planning
 
Updated by [email or web address]: Maria Luz Heart on 20  12:38 pm CT
I called [facility] to check on status of referral, message left to call 
me back. I did speak with patient's , he states his home address is 
[address] [place] [address]. His wife was at a care home at [address] [place] [address]. 
He states that he is unable to care for his wife and the care home is not 
taking good care of her. He gives me his back up facilities at [facility] or [place]. I will continue to follow and assist with 
discharge planning/needs. Awaiting insurance auth.
DCP- Discharge Planning
 
Updated by [id number]: Maria Luz Heart on 20   6:36 am CT
I had left the [facility] form for patient's  to sign last night and it is 
not signed. I called patient's  and his voice mailbox is full and I am 
unable to leave a message. I called patient's room and she does not answer 
the phone.
DCP- Discharge Planning
 
Updated by [id number]: Nakia Lobato on 20   4:04 pm CT
Patient Name: ALEXEY BAGLEY                                     
Admission Status: ER   
 
Accout number: [id number]                              
Admission Date: 2020   
: 1955                                                        
Admission Diagnosis:   
Attending: JAN HUDDLESTON                                                
Current LOS:  2   
  
Anticipated DC Date:    
Planned Disposition:    
Primary Insurance: [employer] PPO   
  
  
Discharge Planning Comments: CM called and spoke with patient's  Jimmy.
  He stated that his wife lives in a private care home and her caregiver just 
tested positive for CoVID today. Plan was initially to discharge back to care 
home today but since her caregiver is ill.  Her spouse wants CM to try to get 
placement in NH facility for the time being. CK completed for [facility]. CM will continue to follow and assist as needed with discharge 
planning / needs. Patient will need to be quarantined for 14days to see if 
she develops any symptoms of COVID.  She will be placed in isolation while 
here in the hospital until placement can be arranged.   
  
  
  
  
  
  
: Nakia Lobato
 DCPIA - Discharge Planning Initial Assessment
 
Updated by [id number]: Nakia Lobato on 20   4:56 pm
*  Is the patient Alert and Oriented?
No
*  PCP
[facility]
*  Pharmacy
[facility]
*  Preadmission Environment
Group Home
*  [facility]
*  ADLs
Partial Dependent
*  Partial ADLs (Assistance needed)
Medication Management
*  List name and contact numbers for known caregivers / representatives who 
currently or will assist patient after discharge:
JIMMY CORBIN [phone]
*  Verbal permission to speak to the caregivers and representatives has been 
obtained from the patient.
Yes
*  Community resources currently utilized
 
None
*  Additional services required to return to the preadmission environment?
No
*  Can the patient safely return to the preadmission environment?
Yes
*  Has this patient been hospitalized within the prior 30 days at any 
hospital?
No
 
 
 
 
 
 
 
Last DP export: 20   8:16 a
Patient Name: ALEXEY BAGLEY
Encounter #: [id number]
Page [id number]
 
 
 
 
 
Electronically Signed by JASMYNE EPPERSON on 20 at 1757
 
 
 
 
 
 
**All edits/amendments must be made on the electronic document**
 
DICTATION DATE: 20     : SENDY  20     
RPT#: [id number]                                DC DATE:        
                                               STATUS: ADM IN  
[facility]
[address] [place] [address]
***END OF REPORT***

## 2020-09-15 VITALS — SYSTOLIC BLOOD PRESSURE: 80 MMHG | DIASTOLIC BLOOD PRESSURE: 43 MMHG

## 2020-09-15 VITALS — DIASTOLIC BLOOD PRESSURE: 77 MMHG | SYSTOLIC BLOOD PRESSURE: 144 MMHG

## 2020-09-15 NOTE — MORECARE
CASE MANAGEMENT DISCHARGE SUMMARY
 
 
PATIENT: ALEXEY BAGLEY                        UNIT: [id number]
ACCOUNT#: [id number]                       ADM DATE: 20
AGE: 64     : 53  SEX: F            ROOM/BED: D.8367    
AUTHOR: ZEENATDOC                             PHYSICIAN:                               
 
REFERRING PHYSICIAN: JAN HUDDLESTON MD               
DATE OF SERVICE: 09/15/20
Discharge Plan
 
 
Patient Name: ALEXEY BAGLEY
Facility: [facility]
Encounter #: [id number]
Medical Record #: [id number]
: 1955
Planned Disposition: Skilled Nursing Facility
Anticipated Discharge Date: 20
 
Discharge Date: 
Expected LOS: 16
Initial Reviewer: [id number]
Initial Review Date: 2020
Generated: 9/15/20   4:00 pm 
Comments
 
DCP- Discharge Planning
 
Updated by [id number]: Ducle Carter on 9/15/20   1:54 pm CT
Patient Name:  ALEXEY BAGLEY   
Encounter No:  [id number]   
:  1955   
Primary Insurance:  [employer] PPO  
Anticipated DC Date: 2020   
Planned Disposition:  Skilled Nursing Facility  
External Planned Provider: :   
  
  
DCP follow-up note: CM spoke with Glory who states [facility] will 
accept the patient today into a [facility] bed.  Nursing can call report to [facility].  
Glory is faxing CM a non smoking release form for pt to sign, and get faxed 
back to facility.  once this is done Nursing can call report.  Transportation 
will be her . Patient and family in agreement with discharge plan. No 
changes to plan. Case management will follow and assist as needed.  
Dulce Carter
DCP- Discharge Planning
 
Updated by [id number]: Dulce Carter on 20   4:55 pm CT
 
Patient Name:  ALEXEY BAGLEY   
Encounter No:  [id number]   
:  1955   
Primary Insurance:  [employer] PP  
Anticipated DC Date: 2020   
Planned Disposition:  Skilled Nursing Facility  
External Planned Provider: :   
  
  
DCP follow-up note: CM received call from Glory at [facility] who states 
he received a phone call from pt  who states she is refusing to go to 
[facility] and hung up on him.  glory states he is unable to do eval if 
pt is refusing to go.  Cm met with pt in room.  Patient states is not 
refusing to go to [facility], but she will require her things such as 
clothes.  Cm told pt she will be allowed to have her things, after she comes 
out of isolation.  patient states she is ready to go to [facility].  Cm 
called glory back to update.  Glory states he will be buy in the morning to 
evaluate pt and get forms signed at 0830 9/15.  Patient and family in 
agreement with discharge plan. No changes to plan. Case management will 
follow and assist as needed.  
Dulce Carter
DCP- Discharge Planning
 
Updated by [id number]: Dulce Carter on 20   8:13 am CT
CM called [facility] at [phone] and spoke to Brittanie about 
pending discharge.  Brittanie states that the facility is accepting the patient 
today but paperwork will need to be filled out prior to admission.  Brittanie 
states she will have Glory call CM with details about pending admission.  
  
Dulce Carter
DCP- Discharge Planning
 
Updated by [id number]: Nakia Lobato on 20   2:41 pm CT
Spoke with Glory at [facility] he stated that he would be here tomorrow 
to evaluate patient.  CM will continue to follow and assist as needed with 
discharge planning / needs.
DCP- Discharge Planning
 
Updated by [email or web address]: Maria Luz Heart on 20  10:26 am CT
I have left a message with Glory at [facility] to call regarding auth 
for admission and faxed additional clinical including most recent negative 
Covid test. I have called patient's  and was unable to leave a voice 
mail as his voice mailbox is full. CM will continue to follow and assist with 
discharge planning/needs.
DCP- Discharge Planning
 
Updated by [id number]: Maria Luz Heart on 9/3/20   8:17 am CT
I spoke with Glory with [facility] and refaxed paper work per his 
request.
DCP- Discharge Planning
 
Updated by [email or web address]: Maria Luz Heart on 20  12:38 pm CT
 
I called [facility] to check on status of referral, message left to call 
me back. I did speak with patient's , he states his home address is 
[address] [place] [address]. His wife was at a care home at [address] [place] [address]. 
He states that he is unable to care for his wife and the care home is not 
taking good care of her. He gives me his back up facilities at [facility] or [place]. I will continue to follow and assist with 
discharge planning/needs. Awaiting insurance auth.
DCP- Discharge Planning
 
Updated by [email or web address]: Maria Luz Heart on 20   6:36 am CT
I had left the [facility] form for patient's  to sign last night and it is 
not signed. I called patient's  and his voice mailbox is full and I am 
unable to leave a message. I called patient's room and she does not answer 
the phone.
DCP- Discharge Planning
 
Updated by [id number]: Nakia Lobato on 20   4:04 pm CT
Patient Name: ALEXEY BAGLEY                                     
Admission Status: ER   
Accout number: [id number]                              
Admission Date: 2020   
: 1955                                                        
Admission Diagnosis:   
Attending: JAN HUDDLESTON                                                
Current LOS:  2   
  
Anticipated DC Date:    
Planned Disposition:    
Primary Insurance: [employer] PPO   
  
  
Discharge Planning Comments: CM called and spoke with patient's  Jimmy.
  He stated that his wife lives in a private care home and her caregiver just 
tested positive for CoVID today. Plan was initially to discharge back to care 
home today but since her caregiver is ill.  Her spouse wants CM to try to get 
placement in NH facility for the time being. CK completed for [facility]. CM will continue to follow and assist as needed with discharge 
planning / needs. Patient will need to be quarantined for 14days to see if 
she develops any symptoms of COVID.  She will be placed in isolation while 
here in the hospital until placement can be arranged.   
  
  
  
  
  
  
: Nakia Lobato
 DCPIA - Discharge Planning Initial Assessment
 
Updated by [email or web address]: Nakia Lobato on 20   4:56 pm
*  Is the patient Alert and Oriented?
No
 
*  PCP
[facility]
*  Pharmacy
[facility]
*  Preadmission Environment
Group Home
*  [facility]
*  ADLs
Partial Dependent
*  Partial ADLs (Assistance needed)
Medication Management
*  List name and contact numbers for known caregivers / representatives who 
currently or will assist patient after discharge:
JIMMY EMERALD [phone]
*  Verbal permission to speak to the caregivers and representatives has been 
obtained from the patient.
Yes
*  Community resources currently utilized
None
*  Additional services required to return to the preadmission environment?
No
*  Can the patient safely return to the preadmission environment?
Yes
*  Has this patient been hospitalized within the prior 30 days at any 
hospital?
No
 
 
 
 
 
 
 
Last DP export: 20   4:57 p
Patient Name: ALEXEY BAGLEY
 
Encounter #: [id number]
Page [id number]
 
 
 
 
 
Electronically Signed by JASMYNE EPPERSON on 09/15/20 at 1500
 
 
 
 
 
 
**All edits/amendments must be made on the electronic document**
 
DICTATION DATE: 09/15/20 1500     : SENDY  09/15/20 1500     
RPT#: [id number]                                DC DATE:        
                                               STATUS: ADM IN  
[facility]
[address] [place] [address]
***END OF REPORT***

## 2020-09-15 NOTE — MORECARE
CASE MANAGEMENT DISCHARGE SUMMARY
 
 
PATIENT: ALEXEY BAGLEY                        UNIT: [id number]
ACCOUNT#: [id number]                       ADM DATE: 20
AGE: 64     : 53  SEX: F            ROOM/BED: D.7405    
AUTHOR: ZEENATDOC                             PHYSICIAN:                               
 
REFERRING PHYSICIAN: JAN HUDDLESTON MD               
DATE OF SERVICE: 09/15/20
Discharge Plan
 
 
Patient Name: ALEXEY BAGLEY
Facility: [facility]
Encounter #: [id number]
Medical Record #: [id number]
: 1955
Planned Disposition: Skilled Nursing Facility
Anticipated Discharge Date: 20
 
Discharge Date: 09/15/2020
Expected LOS: 16
Initial Reviewer: [id number]
Initial Review Date: 2020
Generated: 9/15/20   5:49 pm 
Comments
 
DCP- Discharge Planning
 
Updated by [id number]: Dulce Carter on 9/15/20   1:54 pm CT
Patient Name:  ALEXEY BAGLEY   
Encounter No:  [id number]   
:  1955   
Primary Insurance:  [employer] PPO  
Anticipated DC Date: 2020   
Planned Disposition:  Skilled Nursing Facility  
External Planned Provider: :   
  
  
DCP follow-up note: CM spoke with Glory who states [facility] will 
accept the patient today into a [facility] bed.  Nursing can call report to [facility].  
Glory is faxing CM a non smoking release form for pt to sign, and get faxed 
back to facility.  once this is done Nursing can call report.  Transportation 
will be her . Patient and family in agreement with discharge plan. No 
changes to plan. Case management will follow and assist as needed.  
Dulce Carter
DCP- Discharge Planning
 
Updated by [id number]: Dulce Carter on 20   4:55 pm CT
 
Patient Name:  ALEXEY BAGLEY   
Encounter No:  [id number]   
:  1955   
Primary Insurance:  [employer] PPO  
Anticipated DC Date: 2020   
Planned Disposition:  Skilled Nursing Facility  
External Planned Provider: :   
  
  
DCP follow-up note: CM received call from Glory at [facility] who states 
he received a phone call from pt  who states she is refusing to go to 
[facility] and hung up on him.  glory states he is unable to do eval if 
pt is refusing to go.  Cm met with pt in room.  Patient states is not 
refusing to go to [facility], but she will require her things such as 
clothes.  Cm told pt she will be allowed to have her things, after she comes 
out of isolation.  patient states she is ready to go to [facility].  Cm 
called glory back to update.  Glory states he will be buy in the morning to 
evaluate pt and get forms signed at 0830 9/15.  Patient and family in 
agreement with discharge plan. No changes to plan. Case management will 
follow and assist as needed.  
Dulce Carter
DCP- Discharge Planning
 
Updated by [email or web address]: Dulce Carter on 20   8:13 am CT
CM called [facility] at [phone] and spoke to Brittanie about 
pending discharge.  Brittanie states that the facility is accepting the patient 
today but paperwork will need to be filled out prior to admission.  Brittanie 
states she will have Glory call CM with details about pending admission.  
  
Dulce Carter
DCP- Discharge Planning
 
Updated by [id number]: Nakia Lobato on 20   2:41 pm CT
Spoke with Glory at [facility] he stated that he would be here tomorrow 
to evaluate patient.  CM will continue to follow and assist as needed with 
discharge planning / needs.
DCP- Discharge Planning
 
Updated by [id number]: Maria Luz Heart on 20  10:26 am CT
I have left a message with Glory at [facility] to call regarding auth 
for admission and faxed additional clinical including most recent negative 
Covid test. I have called patient's  and was unable to leave a voice 
mail as his voice mailbox is full. CM will continue to follow and assist with 
discharge planning/needs.
DCP- Discharge Planning
 
Updated by [email or web address]: Maria Luz Heatr on 9/3/20   8:17 am CT
I spoke with Glory with [facility] and refaxed paper work per his 
request.
DCP- Discharge Planning
 
Updated by [id number]: Maria Luz Heart on 20  12:38 pm CT
 
I called [facility] to check on status of referral, message left to call 
me back. I did speak with patient's , he states his home address is 
[address] [place] [address]. His wife was at a care home at [address] [place] [address]. 
He states that he is unable to care for his wife and the care home is not 
taking good care of her. He gives me his back up facilities at [facility] or [place]. I will continue to follow and assist with 
discharge planning/needs. Awaiting insurance auth.
DCP- Discharge Planning
 
Updated by [id number]: Maria Luz Heart on 20   6:36 am CT
I had left the [facility] form for patient's  to sign last night and it is 
not signed. I called patient's  and his voice mailbox is full and I am 
unable to leave a message. I called patient's room and she does not answer 
the phone.
DCP- Discharge Planning
 
Updated by [id number]: Nakia Lobato on 20   4:04 pm CT
Patient Name: ALEXEY BAGLEY                                     
Admission Status: ER   
Accout number: [id number]                              
Admission Date: 2020   
: 1955                                                        
Admission Diagnosis:   
Attending: JAN HUDDLESTON                                                
Current LOS:  2   
  
Anticipated DC Date:    
Planned Disposition:    
Primary Insurance: [employer] PPO   
  
  
Discharge Planning Comments: CM called and spoke with patient's  Jimmy.
  He stated that his wife lives in a private care home and her caregiver just 
tested positive for CoVID today. Plan was initially to discharge back to care 
home today but since her caregiver is ill.  Her spouse wants CM to try to get 
placement in NH facility for the time being. CK completed for [facility]. CM will continue to follow and assist as needed with discharge 
planning / needs. Patient will need to be quarantined for 14days to see if 
she develops any symptoms of COVID.  She will be placed in isolation while 
here in the hospital until placement can be arranged.   
  
  
  
  
  
  
: Nakia Lobato
 DCPIA - Discharge Planning Initial Assessment
 
Updated by [id number]: Nakia Lobato on 20   4:56 pm
*  Is the patient Alert and Oriented?
No
 
*  PCP
[facility]
*  Pharmacy
[facility]
*  Preadmission Environment
Group Home
*  [facility]
*  ADLs
Partial Dependent
*  Partial ADLs (Assistance needed)
Medication Management
*  List name and contact numbers for known caregivers / representatives who 
currently or will assist patient after discharge:
JIMMY CORBIN [phone]
*  Verbal permission to speak to the caregivers and representatives has been 
obtained from the patient.
Yes
*  Community resources currently utilized
None
*  Additional services required to return to the preadmission environment?
No
*  Can the patient safely return to the preadmission environment?
Yes
*  Has this patient been hospitalized within the prior 30 days at any 
hospital?
No
 
 
 
 
 
 
 
Last DP export: 9/15/20   2:00 p
Patient Name: ALEXEY BALGEY
 
Encounter #: [id number]
Page [phone]
 
 
 
 
 
Electronically Signed by JASMYNE EPPERSON on 09/15/20 at 1649
 
 
 
 
 
 
**All edits/amendments must be made on the electronic document**
 
DICTATION DATE: 09/15/20 1649     : SENDY  09/15/20 1649     
RPT#: [id number]                                DC DATE:09/15/20
                                               STATUS: DIS IN  
[facility]
[address] [place] [address]
***END OF REPORT***

## 2020-09-15 NOTE — NUR
ALERT AND ORIENTED X4. SITTING UP IN BED. DISCHARGE INSTRUCTIONS GIVEN
VERBALLY AND WRITTEN. DISCHARGE PAPERS SIGNED ON CHART. REQUEST [facility]
PRESCRIPTION. CALL  PER PATIENT REQUEST. CALL FELIX NOGUERA, REPORT
TO JUNIOR HOLLAND. WAIT FOR SPOUSE TO ARRIVE FOR TRANSPORT.

## 2020-09-15 NOTE — NUR
RECIEVE REPORT. RESTING IN BED WITH EYES CLOSED. WAITING FOR [facility]
TO EVALUATE FOR DISCHARGE TO FACILITY. NO SIGNS OF DISTRESS. CONTINUE PLAN OF
CARE AND SAFETY PRECAUTIONS.

## 2020-09-16 NOTE — [facility]
CASE MANAGEMENT DISCHARGE SUMMARY
 
 
PATIENT: ALEXEY BAGLEY                        UNIT: [id number]
ACCOUNT#: [id number]                       ADM DATE: 20
AGE: 64     : [birth year]  SEX: F            ROOM/BED: D.4536    
AUTHOR: ZEENATDOC                             PHYSICIAN:                               
 
REFERRING PHYSICIAN: JAN HUDDLESTON MD               
DATE OF SERVICE: 20
Discharge Plan
 
 
Patient Name: ALEXEY BAGLEY
Facility: [facility]
Encounter #: [id number]
Medical Record #: [id number]
: 1955
Planned Disposition: Skilled Nursing Facility
Anticipated Discharge Date: 20
 
Discharge Date: 09/15/2020
Expected LOS: 16
Initial Reviewer: [id number]
Initial Review Date: 2020
Generated: 20  10:14 am 
Comments
 
DCP- Discharge Planning
 
Updated by [email or web address]: Dulce Carter on 9/15/20   1:54 pm CT
Patient Name:  ALEXEY BAGLEY   
Encounter No:  [id number]   
:  1955   
Primary Insurance:  [employer] PPO  
Anticipated DC Date: 2020   
Planned Disposition:  Skilled Nursing Facility  
External Planned Provider: :   
  
  
DCP follow-up note: CM spoke with Glory who states [facility] will 
accept the patient today into a [facility] bed.  Nursing can call report to [facility].  
Glory is faxing CM a non smoking release form for pt to sign, and get faxed 
back to facility.  once this is done Nursing can call report.  Transportation 
will be her . Patient and family in agreement with discharge plan. No 
changes to plan. Case management will follow and assist as needed.  
Dulce Carter
DCP- Discharge Planning
 
Updated by [email or web address]: Dulce Carter on 20   4:55 pm CT
 
Patient Name:  ALEXEY BAGLEY   
Encounter No:  [id number]   
:  1955   
Primary Insurance:  [employer] PPO  
Anticipated DC Date: 2020   
Planned Disposition:  Skilled Nursing Facility  
External Planned Provider: :   
  
  
DCP follow-up note: CM received call from Glory at [facility] who states 
he received a phone call from pt  who states she is refusing to go to 
[facility] and hung up on him.  glory states he is unable to do eval if 
pt is refusing to go.  Cm met with pt in room.  Patient states is not 
refusing to go to [facility], but she will require her things such as 
clothes.  Cm told pt she will be allowed to have her things, after she comes 
out of isolation.  patient states she is ready to go to [facility].  Cm 
called glory back to update.  Glory states he will be buy in the morning to 
evaluate pt and get forms signed at 0830 9/15.  Patient and family in 
agreement with discharge plan. No changes to plan. Case management will 
follow and assist as needed.  
Dulce Carter
DCP- Discharge Planning
 
Updated by [id number]: Dulce Carter on 20   8:13 am CT
CM called [facility] at [phone] and spoke to Brittanie about 
pending discharge.  Brittanie states that the facility is accepting the patient 
today but paperwork will need to be filled out prior to admission.  Brittanie 
states she will have Glory call CM with details about pending admission.  
  
Dulce Carter
DCP- Discharge Planning
 
Updated by [email or web address]: Nakia Lobato on 20   2:41 pm CT
Spoke with Glory at [facility] he stated that he would be here tomorrow 
to evaluate patient.  CM will continue to follow and assist as needed with 
discharge planning / needs.
DCP- Discharge Planning
 
Updated by [id number]: Maria Luz Heart on 20  10:26 am CT
I have left a message with Glory at [facility] to call regarding auth 
for admission and faxed additional clinical including most recent negative 
Covid test. I have called patient's  and was unable to leave a voice 
mail as his voice mailbox is full. CM will continue to follow and assist with 
discharge planning/needs.
DCP- Discharge Planning
 
Updated by [email or web address]: Maria Luz Heart on 9/3/20   8:17 am CT
I spoke with Glory with [facility] and refaxed paper work per his 
request.
DCP- Discharge Planning
 
Updated by [id number]: Maria Luz Heart on 20  12:38 pm CT
 
I called [facility] to check on status of referral, message left to call 
me back. I did speak with patient's , he states his home address is 
[address] [place] [address]. His wife was at a care home at [address] [place] [address]. 
He states that he is unable to care for his wife and the care home is not 
taking good care of her. He gives me his back up facilities at [facility] or [facility]. I will continue to follow and assist with 
discharge planning/needs. Awaiting insurance auth.
DCP- Discharge Planning
 
Updated by [email or web address]: Maria Luz Heart on 20   6:36 am CT
I had left the [place] form for patient's  to sign last night and it is 
not signed. I called patient's  and his voice mailbox is full and I am 
unable to leave a message. I called patient's room and she does not answer 
the phone.
DCP- Discharge Planning
 
Updated by [email or web address]: Nakia Lobato on 20   4:04 pm CT
Patient Name: ALEXEY BAGLEY                                     
Admission Status: ER   
Accout number: [id number]                              
Admission Date: 2020   
: 1955                                                        
Admission Diagnosis:   
Attending: JAN HUDDLESTON                                                
Current LOS:  2   
  
Anticipated DC Date:    
Planned Disposition:    
Primary Insurance: [employer] PPO   
  
  
Discharge Planning Comments: CM called and spoke with patient's  Jimmy.
  He stated that his wife lives in a private care home and her caregiver just 
tested positive for CoVID today. Plan was initially to discharge back to care 
home today but since her caregiver is ill.  Her spouse wants CM to try to get 
placement in NH facility for the time being. CK completed for [facility]. CM will continue to follow and assist as needed with discharge 
planning / needs. Patient will need to be quarantined for 14days to see if 
she develops any symptoms of COVID.  She will be placed in isolation while 
here in the hospital until placement can be arranged.   
  
  
  
  
  
  
: Nakia Lobato
 DCPIA - Discharge Planning Initial Assessment
 
Updated by [id number]: Nakia Lobato on 20   4:56 pm
*  Is the patient Alert and Oriented?
No
 
*  PCP
[facility]
*  Pharmacy
[facility]
*  Preadmission Environment
Group Home
*  Facility Name
[facility]
*  ADLs
Partial Dependent
*  Partial ADLs (Assistance needed)
Medication Management
*  List name and contact numbers for known caregivers / representatives who 
currently or will assist patient after discharge:
JIMMY CORBIN [phone]
*  Verbal permission to speak to the caregivers and representatives has been 
obtained from the patient.
Yes
*  Community resources currently utilized
None
*  Additional services required to return to the preadmission environment?
No
*  Can the patient safely return to the preadmission environment?
Yes
*  Has this patient been hospitalized within the prior 30 days at any 
hospital?
No
 
 
 
 
 
 
 
Last DP export: 9/15/20   3:49 p
Patient Name: ALEXEY BAGLEY
 
Encounter #: [id number]
Page [id number]
 
 
 
 
 
Electronically Signed by JASMYNE EPPERSON on 20 at 0915
 
 
 
 
 
 
**All edits/amendments must be made on the electronic document**
 
DICTATION DATE: 20     : SENDY  20     
RPT#: [id number]                                DC DATE:09/15/20
                                               STATUS: DIS IN  
[facility]
 [place] [address]
***END OF REPORT***

## 2020-09-20 ENCOUNTER — HOSPITAL ENCOUNTER (EMERGENCY)
Dept: HOSPITAL 84 - D.ER | Age: 65
Discharge: NURSING, REHAB OR LONG-TERM CARE | End: 2020-09-20
Payer: COMMERCIAL

## 2020-09-20 VITALS — DIASTOLIC BLOOD PRESSURE: 66 MMHG | SYSTOLIC BLOOD PRESSURE: 117 MMHG

## 2020-09-20 VITALS — WEIGHT: 105.22 LBS | HEIGHT: 62 IN | BODY MASS INDEX: 19.36 KG/M2

## 2020-09-20 DIAGNOSIS — I10: ICD-10-CM

## 2020-09-20 DIAGNOSIS — R45.1: Primary | ICD-10-CM

## 2020-09-20 DIAGNOSIS — Z95.5: ICD-10-CM

## 2020-09-20 DIAGNOSIS — Z79.4: ICD-10-CM

## 2020-09-20 DIAGNOSIS — E11.65: ICD-10-CM

## 2020-09-20 DIAGNOSIS — I25.2: ICD-10-CM

## 2020-09-20 LAB
ALBUMIN SERPL-MCNC: 2.3 G/DL (ref 3.4–5)
ALP SERPL-CCNC: 102 U/L (ref 30–120)
ALT SERPL-CCNC: 19 U/L (ref 10–68)
ANION GAP SERPL CALC-SCNC: 10.9 MMOL/L (ref 8–16)
BACTERIA #/AREA URNS HPF: (no result) HPF
BASOPHILS NFR BLD AUTO: 0.2 % (ref 0–2)
BILIRUB SERPL-MCNC: 0.18 MG/DL (ref 0.2–1.3)
BILIRUB SERPL-MCNC: NEGATIVE MG/DL
BUN SERPL-MCNC: 13 MG/DL (ref 7–18)
CALCIUM SERPL-MCNC: 8.6 MG/DL (ref 8.5–10.1)
CHLORIDE SERPL-SCNC: 103 MMOL/L (ref 98–107)
CO2 SERPL-SCNC: 23 MMOL/L (ref 21–32)
CREAT SERPL-MCNC: 0.8 MG/DL (ref 0.6–1.3)
EOSINOPHIL NFR BLD: 3.3 % (ref 0–7)
ERYTHROCYTE [DISTWIDTH] IN BLOOD BY AUTOMATED COUNT: 13 % (ref 11.5–14.5)
GLOBULIN SER-MCNC: 4.1 G/L
GLUCOSE SERPL-MCNC: 300 MG/DL (ref 74–106)
HCT VFR BLD CALC: 31.7 % (ref 36–48)
HGB BLD-MCNC: 10.1 G/DL (ref 12–16)
IMM GRANULOCYTES NFR BLD: 0.3 % (ref 0–5)
KETONES UR STRIP-MCNC: NEGATIVE MG/DL
LYMPHOCYTES NFR BLD AUTO: 32.2 % (ref 15–50)
MCH RBC QN AUTO: 30 PG (ref 26–34)
MCHC RBC AUTO-ENTMCNC: 31.9 G/DL (ref 31–37)
MCV RBC: 94.1 FL (ref 80–100)
MONOCYTES NFR BLD: 7.4 % (ref 2–11)
NEUTROPHILS NFR BLD AUTO: 56.6 % (ref 40–80)
NITRITE UR-MCNC: NEGATIVE MG/ML
OSMOLALITY SERPL CALC.SUM OF ELEC: 275 MOSM/KG (ref 275–300)
PH UR STRIP: 7 [PH] (ref 5–8)
PLATELET # BLD: 498 10X3/UL (ref 130–400)
PMV BLD AUTO: 9.7 FL (ref 7.4–10.4)
POTASSIUM SERPL-SCNC: 4.9 MMOL/L (ref 3.5–5.1)
PROT SERPL-MCNC: 6.4 G/DL (ref 6.4–8.2)
RBC # BLD AUTO: 3.37 10X6/UL (ref 4–5.4)
SODIUM SERPL-SCNC: 132 MMOL/L (ref 136–145)
SQUAMOUS #/AREA URNS HPF: (no result) /HPF (ref 0–5)
UROBILINOGEN UR-MCNC: 4 MG/DL (ref ?–2)
WBC # BLD AUTO: 8.9 10X3/UL (ref 4.8–10.8)
WBC #/AREA URNS HPF: (no result) HPF (ref 0–4)

## 2020-09-22 ENCOUNTER — HOSPITAL ENCOUNTER (EMERGENCY)
Dept: HOSPITAL 84 - D.ER | Age: 65
Discharge: HOME | End: 2020-09-22
Payer: COMMERCIAL

## 2020-09-22 VITALS
HEIGHT: 62 IN | BODY MASS INDEX: 21.21 KG/M2 | BODY MASS INDEX: 21.21 KG/M2 | HEIGHT: 62 IN | WEIGHT: 115.24 LBS | WEIGHT: 115.24 LBS

## 2020-09-22 VITALS — DIASTOLIC BLOOD PRESSURE: 70 MMHG | SYSTOLIC BLOOD PRESSURE: 120 MMHG

## 2020-09-22 DIAGNOSIS — Z79.4: ICD-10-CM

## 2020-09-22 DIAGNOSIS — I25.2: ICD-10-CM

## 2020-09-22 DIAGNOSIS — Z86.73: ICD-10-CM

## 2020-09-22 DIAGNOSIS — I10: ICD-10-CM

## 2020-09-22 DIAGNOSIS — E11.40: ICD-10-CM

## 2020-09-22 DIAGNOSIS — F03.91: Primary | ICD-10-CM

## 2022-10-19 NOTE — NUR
Called patient to advise her that per RSM he had spoke with her at her visit that no more opioids will be given because she had failed her drug screen patient did not answer, LVM RECEIVED IN HALLWAY OUTSIDE OF NURSES STATION. CALM AND COOPERATIVE WITH CARE
AND ASSESSMENT. DENIES SUICIDAL IDEATION. NO AGGRESSIVE BEHAVIOR. REDIRECT AND
REORIENT AS NEEDED. EATING BREAKFAST AT THIS TIME. CONTINUE PLAN OF CARE.